# Patient Record
Sex: FEMALE | Race: WHITE | NOT HISPANIC OR LATINO | Employment: FULL TIME | URBAN - METROPOLITAN AREA
[De-identification: names, ages, dates, MRNs, and addresses within clinical notes are randomized per-mention and may not be internally consistent; named-entity substitution may affect disease eponyms.]

---

## 2023-03-20 PROBLEM — E66.813 CLASS 3 SEVERE OBESITY WITH SERIOUS COMORBIDITY AND BODY MASS INDEX (BMI) OF 40.0 TO 44.9 IN ADULT: Status: ACTIVE | Noted: 2023-03-20

## 2024-03-01 LAB
EXTERNAL HEPATITIS B SURFACE ANTIGEN: NEGATIVE
EXTERNAL HEPATITIS C AB: NON REACTIVE
EXTERNAL RUBELLA QUALITATIVE: NORMAL
HIV1 P24 AG SERPL QL IA: NORMAL

## 2024-04-05 ENCOUNTER — APPOINTMENT (OUTPATIENT)
Dept: ULTRASOUND IMAGING | Facility: HOSPITAL | Age: 29
End: 2024-04-05
Payer: COMMERCIAL

## 2024-04-05 ENCOUNTER — HOSPITAL ENCOUNTER (EMERGENCY)
Facility: HOSPITAL | Age: 29
Discharge: HOME | End: 2024-04-06
Attending: EMERGENCY MEDICINE
Payer: COMMERCIAL

## 2024-04-05 DIAGNOSIS — N89.8 VAGINAL DISCHARGE DURING PREGNANCY IN SECOND TRIMESTER: Primary | ICD-10-CM

## 2024-04-05 DIAGNOSIS — O26.892 VAGINAL DISCHARGE DURING PREGNANCY IN SECOND TRIMESTER: Primary | ICD-10-CM

## 2024-04-05 LAB
ALBUMIN SERPL-MCNC: 4.1 G/DL (ref 3.5–5.2)
ALBUMIN/GLOB SERPL: 1.4 G/DL
ALP SERPL-CCNC: 69 U/L (ref 39–117)
ALT SERPL W P-5'-P-CCNC: 13 U/L (ref 1–33)
ANION GAP SERPL CALCULATED.3IONS-SCNC: 12.9 MMOL/L (ref 5–15)
AST SERPL-CCNC: 16 U/L (ref 1–32)
BASOPHILS # BLD AUTO: 0.04 10*3/MM3 (ref 0–0.2)
BASOPHILS NFR BLD AUTO: 0.5 % (ref 0–1.5)
BILIRUB SERPL-MCNC: 0.3 MG/DL (ref 0–1.2)
BILIRUB UR QL STRIP: NEGATIVE
BUN SERPL-MCNC: 6 MG/DL (ref 6–20)
BUN/CREAT SERPL: 9.8 (ref 7–25)
CALCIUM SPEC-SCNC: 9.1 MG/DL (ref 8.6–10.5)
CHLORIDE SERPL-SCNC: 102 MMOL/L (ref 98–107)
CLARITY UR: ABNORMAL
CO2 SERPL-SCNC: 22.1 MMOL/L (ref 22–29)
COLOR UR: YELLOW
CREAT SERPL-MCNC: 0.61 MG/DL (ref 0.57–1)
DEPRECATED RDW RBC AUTO: 45.6 FL (ref 37–54)
EGFRCR SERPLBLD CKD-EPI 2021: 124.3 ML/MIN/1.73
EOSINOPHIL # BLD AUTO: 0.11 10*3/MM3 (ref 0–0.4)
EOSINOPHIL NFR BLD AUTO: 1.4 % (ref 0.3–6.2)
ERYTHROCYTE [DISTWIDTH] IN BLOOD BY AUTOMATED COUNT: 15.2 % (ref 12.3–15.4)
GLOBULIN UR ELPH-MCNC: 2.9 GM/DL
GLUCOSE SERPL-MCNC: 87 MG/DL (ref 65–99)
GLUCOSE UR STRIP-MCNC: NEGATIVE MG/DL
HCG INTACT+B SERPL-ACNC: NORMAL MIU/ML
HCT VFR BLD AUTO: 37.6 % (ref 34–46.6)
HGB BLD-MCNC: 11.8 G/DL (ref 12–15.9)
HGB UR QL STRIP.AUTO: NEGATIVE
IMM GRANULOCYTES # BLD AUTO: 0.03 10*3/MM3 (ref 0–0.05)
IMM GRANULOCYTES NFR BLD AUTO: 0.4 % (ref 0–0.5)
KETONES UR QL STRIP: ABNORMAL
LEUKOCYTE ESTERASE UR QL STRIP.AUTO: NEGATIVE
LYMPHOCYTES # BLD AUTO: 2.84 10*3/MM3 (ref 0.7–3.1)
LYMPHOCYTES NFR BLD AUTO: 36.3 % (ref 19.6–45.3)
MCH RBC QN AUTO: 25.8 PG (ref 26.6–33)
MCHC RBC AUTO-ENTMCNC: 31.4 G/DL (ref 31.5–35.7)
MCV RBC AUTO: 82.3 FL (ref 79–97)
MONOCYTES # BLD AUTO: 0.37 10*3/MM3 (ref 0.1–0.9)
MONOCYTES NFR BLD AUTO: 4.7 % (ref 5–12)
NEUTROPHILS NFR BLD AUTO: 4.44 10*3/MM3 (ref 1.7–7)
NEUTROPHILS NFR BLD AUTO: 56.7 % (ref 42.7–76)
NITRITE UR QL STRIP: NEGATIVE
NRBC BLD AUTO-RTO: 0 /100 WBC (ref 0–0.2)
PH UR STRIP.AUTO: 5.5 [PH] (ref 5–8)
PLATELET # BLD AUTO: 271 10*3/MM3 (ref 140–450)
PMV BLD AUTO: 9.7 FL (ref 6–12)
POTASSIUM SERPL-SCNC: 3.8 MMOL/L (ref 3.5–5.2)
PROT SERPL-MCNC: 7 G/DL (ref 6–8.5)
PROT UR QL STRIP: ABNORMAL
RBC # BLD AUTO: 4.57 10*6/MM3 (ref 3.77–5.28)
SODIUM SERPL-SCNC: 137 MMOL/L (ref 136–145)
SP GR UR STRIP: 1.02 (ref 1–1.03)
UROBILINOGEN UR QL STRIP: ABNORMAL
WBC NRBC COR # BLD AUTO: 7.83 10*3/MM3 (ref 3.4–10.8)

## 2024-04-05 PROCEDURE — 84702 CHORIONIC GONADOTROPIN TEST: CPT | Performed by: PHYSICIAN ASSISTANT

## 2024-04-05 PROCEDURE — 99284 EMERGENCY DEPT VISIT MOD MDM: CPT

## 2024-04-05 PROCEDURE — 80053 COMPREHEN METABOLIC PANEL: CPT | Performed by: EMERGENCY MEDICINE

## 2024-04-05 PROCEDURE — 85025 COMPLETE CBC W/AUTO DIFF WBC: CPT | Performed by: EMERGENCY MEDICINE

## 2024-04-05 PROCEDURE — 76815 OB US LIMITED FETUS(S): CPT

## 2024-04-05 PROCEDURE — 93976 VASCULAR STUDY: CPT

## 2024-04-05 PROCEDURE — 81003 URINALYSIS AUTO W/O SCOPE: CPT | Performed by: EMERGENCY MEDICINE

## 2024-04-05 PROCEDURE — 76817 TRANSVAGINAL US OBSTETRIC: CPT

## 2024-04-05 PROCEDURE — 36415 COLL VENOUS BLD VENIPUNCTURE: CPT

## 2024-04-06 VITALS
TEMPERATURE: 97.3 F | WEIGHT: 293 LBS | OXYGEN SATURATION: 100 % | BODY MASS INDEX: 45.99 KG/M2 | HEART RATE: 79 BPM | HEIGHT: 67 IN | RESPIRATION RATE: 18 BRPM | SYSTOLIC BLOOD PRESSURE: 134 MMHG | DIASTOLIC BLOOD PRESSURE: 72 MMHG

## 2024-04-06 NOTE — ED TRIAGE NOTES
Pt ambulatory to triage via pv  Pt is 13 weeks pregnant. Pt reports brown discharge that started 2 hours pta. Pt reports some abd cramping.

## 2024-04-06 NOTE — DISCHARGE INSTRUCTIONS
Please follow-up with your OB/GYN.    Although you are being discharged in the ED today, I encouraged her to return for worsening symptoms.  Things can, and do, change such a treatment at home with medication may not be adequate.  Specifically I recommend returning for chest pain or discomfort, difficulty breathing, persistent vomiting or difficulty holding down liquids or medications, fever > 102.0 F,  or any other worsening or alarming symptoms.       You have been evaluated in the emergency department for your presenting symptoms and deemed appropriate for discharge home.  Understand that your health care does not end here today.  It is important that your primary care physician be made aware of your visit.  I recommend calling your primary care provider in the next 48 hours to arrange follow-up care.  Your primary care provider needs to review your treatment and recovery to ensure that no further treatment is necessary.  Additional testing or procedures may be necessary as an outpatient at the discretion of your primary care provider.    I also recommend following up with your PCP for recheck of your blood pressure and treatment as needed.

## 2024-04-06 NOTE — ED PROVIDER NOTES
EMERGENCY DEPARTMENT ENCOUNTER  Room Number:    PCP: Cherelle Marcus APRN  Independent Historians: Patient      HPI:  Chief Complaint: had concerns including Vaginal Discharge.     A complete HPI/ROS/PMH/PSH/SH/FH are unobtainable due to: None    Chronic or social conditions impacting patient care (Social Determinants of Health): None      Context: Yusra Dumont is a 28 y.o.  female who is approximately 13 weeks pregnant with a medical history of depression who presents emergency department complaining of brown vaginal discharge that began earlier today.  Patient reports that she has had some lower abdominal cramping throughout her entire pregnancy but today started having some brown discharge.  She denies any red bleeding or bleeding large clots or tissue.  She reports nausea but denies any vomiting or diarrhea.  She denies fever or chills.  She denies urinary symptoms.      Review of prior external notes (non-ED) -and- Review of prior external test results outside of this encounter:   Blood type, O+    Hemoglobin A1c from 5/3/2023 was 5.2    I reviewed labs from 5/3/2023, hemoglobin 12.7      PAST MEDICAL HISTORY  Active Ambulatory Problems     Diagnosis Date Noted    Lumbar radiculopathy 2018    Cervical radiculitis 2019    Intractable migraine without aura and without status migrainosus 2020    Bilateral occipital neuralgia 2020    Ankle swelling 2023    Chronic fatigue 2023    Foot pain, bilateral 2023    Back pain 2023    Depression 2023    Class 3 severe obesity with serious comorbidity and body mass index (BMI) of 40.0 to 44.9 in adult 2023    Sacroiliac joint dysfunction of right side 2023     Resolved Ambulatory Problems     Diagnosis Date Noted    Right upper quadrant pain 2019    Folliculitis 2019    Weight gain 2020    Heartburn 2023    Nausea & vomiting 2023     Past Medical History:    Diagnosis Date    Anxiety attack     Dizziness     GERD (gastroesophageal reflux disease)     Headaches, cluster     History of medical problems BIlateral club foot surgery, Knee surgery    Irritable bowel syndrome     Low back pain     Migraine     Numbness and tingling     Peripheral neuropathy     PONV (postoperative nausea and vomiting)     Tension headache          PAST SURGICAL HISTORY  Past Surgical History:   Procedure Laterality Date    CLUB FOOT RELEASE       sugeries all under the age of 5    ENDOSCOPY N/A 2019    Procedure: ESOPHAGOGASTRODUODENOSCOPY with BX;  Surgeon: Fabio Peres MD;  Location: [facility] ENDOSCOPY;  Service: Gastroenterology    EPIDURAL BLOCK      KNEE SURGERY Left     scope, ligament repair    LAMINECTOMY  2019    LUMBAR DISCECTOMY Right 2019    Procedure: Right L3-4 laminectomy and discectomy with metrx;  Surgeon: Dean Putnam MD;  Location: [facility] MAIN OR;  Service: Neurosurgery    SACROILIAC JOINT INJECTION Right 10/19/2023    Procedure: SACROILIAC INJECTION;  Surgeon: Dmitry Coronado MD;  [place] [facility] [place]  Service: Pain Management;  Laterality: Right;    TONSILLECTOMY      UPPER GASTROINTESTINAL ENDOSCOPY  approx     normal per pt          FAMILY HISTORY  Family History   Problem Relation Age of Onset    Lung cancer Maternal Grandmother     Mental illness Maternal Grandmother     Hyperlipidemia Maternal Grandmother     Heart disease Maternal Grandfather          SOCIAL HISTORY  Social History     Socioeconomic History    Marital status:     Number of children: 0    Years of education: College   Tobacco Use    Smoking status: Former     Current packs/day: 0.00     Average packs/day: 0.3 packs/day for 5.0 years (1.3 ttl pk-yrs)     Types: Cigarettes     Start date: 2016     Quit date: 2021     Years since quittin.9     Passive exposure: Past    Smokeless tobacco: Never   Vaping Use    Vaping status: Never Used     Passive vaping exposure: Yes   Substance and Sexual Activity    Alcohol use: Yes     Comment: Socially    Drug use: No    Sexual activity: Yes     Partners: Male     Birth control/protection: None         ALLERGIES  Tylenol [acetaminophen]      REVIEW OF SYSTEMS  Included in HPI  All systems reviewed and negative except for those discussed in HPI.      PHYSICAL EXAM    I have reviewed the triage vital signs and nursing notes.    ED Triage Vitals   Temp Heart Rate Resp BP SpO2   04/05/24 2110 04/05/24 2110 04/05/24 2110 04/05/24 2113 04/05/24 2110   97.3 °F (36.3 °C) 111 18 150/89 100 %      Temp src Heart Rate Source Patient Position BP Location FiO2 (%)   04/05/24 2110 04/05/24 2110 -- -- --   Tympanic Monitor          Physical Exam  Constitutional:       General: She is not in acute distress.     Appearance: Normal appearance. She is obese.   HENT:      Head: Normocephalic and atraumatic.      Nose: Nose normal.      Mouth/Throat:      Mouth: Mucous membranes are moist.   Eyes:      Extraocular Movements: Extraocular movements intact.      Pupils: Pupils are equal, round, and reactive to light.   Cardiovascular:      Rate and Rhythm: Normal rate and regular rhythm.      Pulses: Normal pulses.      Heart sounds: Normal heart sounds.   Pulmonary:      Effort: Pulmonary effort is normal. No respiratory distress.      Breath sounds: Normal breath sounds.   Abdominal:      General: Abdomen is flat. There is no distension.      Palpations: Abdomen is soft.      Tenderness: There is no abdominal tenderness. There is no guarding or rebound.   Musculoskeletal:         General: Normal range of motion.      Cervical back: Normal range of motion and neck supple.   Skin:     General: Skin is warm and dry.      Capillary Refill: Capillary refill takes less than 2 seconds.   Neurological:      General: No focal deficit present.      Mental Status: She is alert and oriented to person, place, and time.   Psychiatric:         Mood  and Affect: Mood normal.         Behavior: Behavior normal.         LAB RESULTS  Recent Results (from the past 24 hour(s))   Comprehensive Metabolic Panel    Collection Time: 04/05/24  9:45 PM    Specimen: Blood   Result Value Ref Range    Glucose 87 65 - 99 mg/dL    BUN 6 6 - 20 mg/dL    Creatinine 0.61 0.57 - 1.00 mg/dL    Sodium 137 136 - 145 mmol/L    Potassium 3.8 3.5 - 5.2 mmol/L    Chloride 102 98 - 107 mmol/L    CO2 22.1 22.0 - 29.0 mmol/L    Calcium 9.1 8.6 - 10.5 mg/dL    Total Protein 7.0 6.0 - 8.5 g/dL    Albumin 4.1 3.5 - 5.2 g/dL    ALT (SGPT) 13 1 - 33 U/L    AST (SGOT) 16 1 - 32 U/L    Alkaline Phosphatase 69 39 - 117 U/L    Total Bilirubin 0.3 0.0 - 1.2 mg/dL    Globulin 2.9 gm/dL    A/G Ratio 1.4 g/dL    BUN/Creatinine Ratio 9.8 7.0 - 25.0    Anion Gap 12.9 5.0 - 15.0 mmol/L    eGFR 124.3 >60.0 mL/min/1.73   CBC Auto Differential    Collection Time: 04/05/24  9:45 PM    Specimen: Blood   Result Value Ref Range    WBC 7.83 3.40 - 10.80 10*3/mm3    RBC 4.57 3.77 - 5.28 10*6/mm3    Hemoglobin 11.8 (L) 12.0 - 15.9 g/dL    Hematocrit 37.6 34.0 - 46.6 %    MCV 82.3 79.0 - 97.0 fL    MCH 25.8 (L) 26.6 - 33.0 pg    MCHC 31.4 (L) 31.5 - 35.7 g/dL    RDW 15.2 12.3 - 15.4 %    RDW-SD 45.6 37.0 - 54.0 fl    MPV 9.7 6.0 - 12.0 fL    Platelets 271 140 - 450 10*3/mm3    Neutrophil % 56.7 42.7 - 76.0 %    Lymphocyte % 36.3 19.6 - 45.3 %    Monocyte % 4.7 (L) 5.0 - 12.0 %    Eosinophil % 1.4 0.3 - 6.2 %    Basophil % 0.5 0.0 - 1.5 %    Immature Grans % 0.4 0.0 - 0.5 %    Neutrophils, Absolute 4.44 1.70 - 7.00 10*3/mm3    Lymphocytes, Absolute 2.84 0.70 - 3.10 10*3/mm3    Monocytes, Absolute 0.37 0.10 - 0.90 10*3/mm3    Eosinophils, Absolute 0.11 0.00 - 0.40 10*3/mm3    Basophils, Absolute 0.04 0.00 - 0.20 10*3/mm3    Immature Grans, Absolute 0.03 0.00 - 0.05 10*3/mm3    nRBC 0.0 0.0 - 0.2 /100 WBC   Urinalysis With Microscopic If Indicated (No Culture) - Urine, Clean Catch    Collection Time: 04/05/24  9:50 PM     Specimen: Urine, Clean Catch   Result Value Ref Range    Color, UA Yellow Yellow, Straw    Appearance, UA Cloudy (A) Clear    pH, UA 5.5 5.0 - 8.0    Specific Gravity, UA 1.025 1.005 - 1.030    Glucose, UA Negative Negative    Ketones, UA 15 mg/dL (1+) (A) Negative    Bilirubin, UA Negative Negative    Blood, UA Negative Negative    Protein, UA Trace (A) Negative    Leuk Esterase, UA Negative Negative    Nitrite, UA Negative Negative    Urobilinogen, UA 0.2 E.U./dL 0.2 - 1.0 E.U./dL   hCG, Quantitative, Pregnancy    Collection Time: 04/05/24 10:11 PM    Specimen: Blood   Result Value Ref Range    HCG Quantitative 28,120.00 mIU/mL         RADIOLOGY  US Testicular or Ovarian Vascular Limited    Result Date: 4/6/2024  Patient: MARGIE ROGEL  Time Out: 01:35 Exam(s): US PELVIS EXAM:   US Pregnancy, transabdominal, Transvaginal with Doppler. CLINICAL HISTORY:    Reason for exam: Spotting, cramping, vaginal discharge.. TECHNIQUE:   Real-time transvaginal and transabdominal obstetrical ultrasound of the maternal pelvis and a first trimester pregnancy with image documentation.  Transvaginal imaging was used for better evaluation of the fetus and adnexa.  Color Doppler and Doppler spectral waveform analysis of the ovarian tissue was performed. COMPARISON:   No relevant prior studies available. FINDINGS:   Gestation:  Fetal heart rate 157 bpm.  There is a single intrauterine fetus.  Fetal pole crown-rump length measures 8 cm consistent with 14 weeks 0 days gestation.  Mean sac diameter 7.9 cm.  Fetal motion is present.  The fetal survey is limited by early gestational age but grossly unremarkable.   Placenta amniotic fluid:  The placenta is posterior grade 1.  The placenta is low-lying.  Consider follow-up ultrasound closer to term to rule out previa.   Uterus cervix:  The uterus measures 12.2 x 10 x 9.7 cm.  No myometrial mass.   Ovaries:  The right ovary measures 3.8 x 3.2 x 3.8 cm with a 2.1 cm simple cyst or follicle  within it.  Doppler blood flow is normal.  The left ovary is obscured.   Free fluid:  No free fluid. IMPRESSION:     1.  The placenta is posterior grade 1.  The placenta is low-lying.  Consider follow-up ultrasound closer to term to rule out previa. 2.  Fetal pole crown-rump length measures 8 cm consistent with 14 weeks 0 days gestation.  The estimated date of delivery is October 4, 2024.    Electronically signed by Fernandez Natarajan MD on 04-06-24 at 0135    US Ob Limited 1 + Fetuses    Result Date: 4/6/2024  Patient: MARGIE ROGEL  Time Out: 01:35 Exam(s): US OB LIMITED EXAM:   US Pregnancy, transabdominal, Transvaginal with Doppler. CLINICAL HISTORY:    Reason for exam: Spotting, cramping, vaginal discharge.. TECHNIQUE:   Real-time transvaginal and transabdominal obstetrical ultrasound of the maternal pelvis and a first trimester pregnancy with image documentation.  Transvaginal imaging was used for better evaluation of the fetus and adnexa.  Color Doppler and Doppler spectral waveform analysis of the ovarian tissue was performed. COMPARISON:   No relevant prior studies available. FINDINGS:   Gestation:  Fetal heart rate 157 bpm.  There is a single intrauterine fetus.  Fetal pole crown-rump length measures 8 cm consistent with 14 weeks 0 days gestation.  Mean sac diameter 7.9 cm.  Fetal motion is present.  The fetal survey is limited by early gestational age but grossly unremarkable.   Placenta amniotic fluid:  The placenta is posterior grade 1.  The placenta is low-lying.  Consider follow-up ultrasound closer to term to rule out previa.   Uterus cervix:  The uterus measures 12.2 x 10 x 9.7 cm.  No myometrial mass.   Ovaries:  The right ovary measures 3.8 x 3.2 x 3.8 cm with a 2.1 cm simple cyst or follicle within it.  Doppler blood flow is normal.  The left ovary is obscured.   Free fluid:  No free fluid. IMPRESSION:     1.  The placenta is posterior grade 1.  The placenta is low-lying.  Consider follow-up  ultrasound closer to term to rule out previa. 2.  Fetal pole crown-rump length measures 8 cm consistent with 14 weeks 0 days gestation.  The estimated date of delivery is October 4, 2024.    Electronically signed by Fernandez Natarajan MD on 04-06-24 at 0135    US Ob Transvaginal    Result Date: 4/6/2024  Patient: MARGIE ROGEL  Time Out: 01:34 Exam(s): US OB ENDOVAG EXAM:   US Pregnancy, transabdominal, Transvaginal with Doppler. CLINICAL HISTORY:    Reason for exam: Spotting, cramping, vaginal discharge.. TECHNIQUE:   Real-time transvaginal and transabdominal obstetrical ultrasound of the maternal pelvis and a first trimester pregnancy with image documentation.  Transvaginal imaging was used for better evaluation of the fetus and adnexa.  Color Doppler and Doppler spectral waveform analysis of the ovarian tissue was performed. COMPARISON:   No relevant prior studies available. FINDINGS:   Gestation:  Fetal heart rate 157 bpm.  There is a single intrauterine fetus.  Fetal pole crown-rump length measures 8 cm consistent with 14 weeks 0 days gestation.  Mean sac diameter 7.9 cm.  Fetal motion is present.  The fetal survey is limited by early gestational age but grossly unremarkable.   Placenta amniotic fluid:  The placenta is posterior grade 1.  The placenta is low-lying.  Consider follow-up ultrasound closer to term to rule out previa.   Uterus cervix:  The uterus measures 12.2 x 10 x 9.7 cm.  No myometrial mass.   Ovaries:  The right ovary measures 3.8 x 3.2 x 3.8 cm with a 2.1 cm simple cyst or follicle within it.  Doppler blood flow is normal.  The left ovary is obscured.   Free fluid:  No free fluid. IMPRESSION:     1.  The placenta is posterior grade 1.  The placenta is low-lying.  Consider follow-up ultrasound closer to term to rule out previa. 2.  Fetal pole crown-rump length measures 8 cm consistent with 14 weeks 0 days gestation.  The estimated date of delivery is October 4, 2024.     Electronically signed by  Fernandez Natarajan MD on 04-06-24 at 0134           OUTPATIENT MEDICATION MANAGEMENT:  No current Epic-ordered facility-administered medications on file.     Current Outpatient Medications Ordered in Epic   Medication Sig Dispense Refill    Prenatal FE-FA-DHA & Choline (ONE A DAY PRENATAL ADV BRAIN PO)              PROGRESS, DATA ANALYSIS, CONSULTS, AND MEDICAL DECISION MAKING  ORDERS PLACED DURING THIS VISIT:  Orders Placed This Encounter   Procedures    US Ob Limited 1 + Fetuses    US Ob Transvaginal    US Testicular or Ovarian Vascular Limited    Comprehensive Metabolic Panel    Urinalysis With Microscopic If Indicated (No Culture) - Urine, Clean Catch    CBC Auto Differential    hCG, Quantitative, Pregnancy    CBC & Differential       All labs have been independently interpreted by me.  All radiology studies have been reviewed by me. All EKG's have been independently viewed and interpreted by me.  Discussion below represents my analysis of pertinent findings related to patient's condition, differential diagnosis, treatment plan and final disposition.    Differential diagnosis includes but is not limited to:   My differential diagnosis for vaginal bleeding in a pregnant patient includes but is not limited to:  Bleeding in the first trimester   miscarriage   threatened miscarriage: with or without identifiable subchorionic hemorrhage   missed miscarriage   incomplete miscarriage   subchorionic hemorrhage   retained products of conception   ectopic pregnancy   gestational trophoblastic disease   demise of a twin   implantation bleeding  Bleeding in second and third trimesters   placental abruption   placenta previa   gestational trophoblastic disease   vasa previa   uterine rupture      ED Course:  ED Course as of 04/06/24 0150   Fri Apr 05, 2024 2159 I discussed the case with Dr. Braxton and they agree to evaluate the patient at the bedside.  [CC]   2208 Hemoglobin(!): 11.8 [CC]   Sat Apr 06, 2024   0034 Rechecked  on patient: She is resting comfortably in bed.  Discussed that we are still awaiting results of ultrasound.  Discussed with her that it appeared she had good fetal heart tones on ultrasound.  Will await final result prior to discharge. [CC]   0106 BP: 134/72 [CC]   0129 I rechecked the patient.  Ultrasound is still pending at the time of discharge but I did discuss that patient has fetal heart tones based on the ultrasound technicians worksheet.  I will call her with any abnormal results seen on ultrasound.  Return precautions were discussed.  I discussed the patient's labs, radiology findings (including all incidental findings), diagnosis, and plan for discharge.  A repeat exam reveals no new worrisome changes from my initial exam findings.  The patient understands that the fact that they are being discharged does not denote that nothing is abnormal, it indicates that no clinical emergency is present and that they must follow-up as directed in order to properly maintain their health.  Follow-up instructions (specifically listed below) and return to ER precautions were given at this time.  I specifically instructed the patient to follow-up with their PCP.  The patient understands and agrees with the plan, and is ready for discharge.  All questions answered.   [CC]      ED Course User Index  [CC] Odette Dong PA-C           AS OF 01:50 EDT VITALS:    BP - 134/72  HR - 79  TEMP - 97.3 °F (36.3 °C) (Tympanic)  O2 SATS - 100%      MDM:  Patient is a well-appearing 28-year-old female who is approximately 13 weeks pregnant who presents emergency department today complaining of brown discharge.  Patient denies any heavy vaginal bleeding or cramping.  On arrival here in the emergency department vitals are reassuring, she is afebrile.  On my exam the patient's abdomen is soft and nontender.  She was evaluated with labs which were overall reassuring.  Patient's blood type is O+.  She was evaluated with transvaginal  ultrasound which did reveal intrauterine pregnancy with fetal heart tones.  She had no recurrence of symptoms here and had no heavy vaginal bleeding.  She does have a low-lying placenta which I discussed with her and she will need to follow-up with OB/GYN for reevaluation as routinely scheduled.  There is no indication for RhoGAM given that she is Rh+.  Overall patient is appropriate for discharge with close outpatient follow-up.          DIAGNOSIS  Final diagnoses:   Vaginal discharge during pregnancy in second trimester         DISPOSITION  ED Disposition       ED Disposition   Discharge    Condition   Stable    Comment   --                      Please note that portions of this document were completed with a voice recognition program.    Note Disclaimer: At [facility], we believe that sharing information builds trust and better relationships. You are receiving this note because you recently visited [facility]. It is possible you will see health information before a provider has talked with you about it. This kind of information can be easy to misunderstand. To help you fully understand what it means for your health, we urge you to discuss this note with your provider.     Odette Dong PA-C  04/06/24 021

## 2024-04-06 NOTE — ED PROVIDER NOTES
MD ATTESTATION NOTE    The DANA and I have discussed this patient's history, physical exam, and treatment plan.  I have reviewed the documentation and personally had a face to face interaction with the patient. I affirm the documentation and agree with the treatment and plan.  The attached note describes my personal findings.      I provided a substantive portion of the care of the patient.  I personally performed the physical exam in its entirety, and below are my findings.     Brief HPI: Patient presents for evaluation of brown discharge.  Patient is 13 weeks pregnant.  This is her first pregnancy.  Patient states has had no vomiting.  Had mild cramping.  Has had prior ultrasound that showed intrauterine pregnancy.    PHYSICAL EXAM  ED Triage Vitals   Temp Heart Rate Resp BP SpO2   04/05/24 2110 04/05/24 2110 04/05/24 2110 04/05/24 2113 04/05/24 2110   97.3 °F (36.3 °C) 111 18 150/89 100 %      Temp src Heart Rate Source Patient Position BP Location FiO2 (%)   04/05/24 2110 04/05/24 2110 -- -- --   Tympanic Monitor            GENERAL: no acute distress  HENT: nares patent  EYES: no scleral icterus  CV: regular rhythm, normal rate  RESPIRATORY: normal effort  ABDOMEN: soft. Minimal tenderness  MUSCULOSKELETAL: no deformity  NEURO: alert, moves all extremities, follows commands  PSYCH:  calm, cooperative  SKIN: warm, dry    Vital signs and nursing notes reviewed.    Ultrasound independently interpreted by me and shows [facility]    ED Course as of 04/06/24 0851   Fri Apr 05, 2024 2159 I discussed the case with Dr. Braxton and they agree to evaluate the patient at the bedside.  [CC]   2208 Hemoglobin(!): 11.8 [CC]   Sat Apr 06, 2024   0034 Rechecked on patient: She is resting comfortably in bed.  Discussed that we are still awaiting results of ultrasound.  Discussed with her that it appeared she had good fetal heart tones on ultrasound.  Will await final result prior to discharge. [CC]   0106 BP: 134/72 [CC]   0129 I  rechecked the patient.  Ultrasound is still pending at the time of discharge but I did discuss that patient has fetal heart tones based on the ultrasound technicians worksheet.  I will call her with any abnormal results seen on ultrasound.  Return precautions were discussed.  I discussed the patient's labs, radiology findings (including all incidental findings), diagnosis, and plan for discharge.  A repeat exam reveals no new worrisome changes from my initial exam findings.  The patient understands that the fact that they are being discharged does not denote that nothing is abnormal, it indicates that no clinical emergency is present and that they must follow-up as directed in order to properly maintain their health.  Follow-up instructions (specifically listed below) and return to ER precautions were given at this time.  I specifically instructed the patient to follow-up with their PCP.  The patient understands and agrees with the plan, and is ready for discharge.  All questions answered.   [CC]      ED Course User Index  [CC] Odette Dong, LEON         Plan: Discharge    SHARED VISIT: This visit was performed by BOTH a physician and an APC. The substantive portion of the medical decision making was performed by this attesting physician who made or approved the management plan and takes responsibility for patient management. All studies in the APC note (if performed) were independently interpreted by me.         Dean Braxton MD  04/06/24 7044

## 2024-04-29 ENCOUNTER — TRANSCRIBE ORDERS (OUTPATIENT)
Dept: ULTRASOUND IMAGING | Facility: HOSPITAL | Age: 29
End: 2024-04-29
Payer: COMMERCIAL

## 2024-04-29 DIAGNOSIS — R89.9 ABNORMAL LABORATORY TEST RESULT: Primary | ICD-10-CM

## 2024-05-10 ENCOUNTER — HOSPITAL ENCOUNTER (OUTPATIENT)
Dept: ULTRASOUND IMAGING | Facility: HOSPITAL | Age: 29
Discharge: HOME | End: 2024-05-10
Payer: COMMERCIAL

## 2024-05-10 ENCOUNTER — OFFICE VISIT (OUTPATIENT)
Dept: OBSTETRICS AND GYNECOLOGY | Facility: CLINIC | Age: 29
End: 2024-05-10
Payer: COMMERCIAL

## 2024-05-10 ENCOUNTER — LAB (OUTPATIENT)
Dept: LAB | Facility: HOSPITAL | Age: 29
End: 2024-05-10
Payer: COMMERCIAL

## 2024-05-10 VITALS
SYSTOLIC BLOOD PRESSURE: 139 MMHG | TEMPERATURE: 98 F | DIASTOLIC BLOOD PRESSURE: 68 MMHG | BODY MASS INDEX: 45.83 KG/M2 | HEART RATE: 77 BPM | HEIGHT: 67 IN | WEIGHT: 292 LBS

## 2024-05-10 DIAGNOSIS — Z13.79 GENETIC TESTING: Primary | ICD-10-CM

## 2024-05-10 DIAGNOSIS — Z13.79 GENETIC TESTING: ICD-10-CM

## 2024-05-10 DIAGNOSIS — R89.9 ABNORMAL LABORATORY TEST RESULT: ICD-10-CM

## 2024-05-10 DIAGNOSIS — E66.01 CLASS 3 SEVERE OBESITY WITHOUT SERIOUS COMORBIDITY IN ADULT, UNSPECIFIED BMI, UNSPECIFIED OBESITY TYPE: ICD-10-CM

## 2024-05-10 PROCEDURE — 76811 OB US DETAILED SNGL FETUS: CPT

## 2024-05-20 ENCOUNTER — TELEPHONE (OUTPATIENT)
Dept: OBSTETRICS AND GYNECOLOGY | Facility: CLINIC | Age: 29
End: 2024-05-20
Payer: COMMERCIAL

## 2024-05-20 NOTE — TELEPHONE ENCOUNTER
Call placed to Yusra to review redraw Panorama results. Pt notified the results again showed low fetal fraction on cell free DNA. Pt notified this does not mean there is a chromosome issue, but Dr. Dale is concerned about placental insufficieny and would like to watch more closely to make sure baby is growing appropriately. Dr. Dale does not recommend redrawing at this time.     Pt offered amniocentesis as an option if she would like further information testing during the pregnancy. Informed pt she could do  genetic testing as well. Pt notified amniocentesis is an ultrasound guided procedure in which a needle is inserted into the abdomen in order to take amniotic fluid from around the baby to send off for additional testing. Pt would like to speak with her  before deciding. Yusra encouraged to call [facility] once she has decided. Pt aware she can also speak with Dr. Dale in greater detail prior to deciding on amniocentesis as well. No additional questions at this time.

## 2024-05-24 ENCOUNTER — TRANSCRIBE ORDERS (OUTPATIENT)
Dept: ULTRASOUND IMAGING | Facility: HOSPITAL | Age: 29
End: 2024-05-24
Payer: COMMERCIAL

## 2024-05-24 DIAGNOSIS — R89.9 ABNORMAL LABORATORY TEST RESULT: Primary | ICD-10-CM

## 2024-05-28 ENCOUNTER — TELEPHONE (OUTPATIENT)
Dept: OBSTETRICS AND GYNECOLOGY | Facility: CLINIC | Age: 29
End: 2024-05-28
Payer: COMMERCIAL

## 2024-05-28 NOTE — TELEPHONE ENCOUNTER
Call placed to Yusra to see if she had any further questions regarding amniocentesis. Pt and her  have decided they do not want amniocentesis at this time and would rather have  testing if needed.

## 2024-06-14 ENCOUNTER — HOSPITAL ENCOUNTER (OUTPATIENT)
Dept: ULTRASOUND IMAGING | Facility: HOSPITAL | Age: 29
Discharge: HOME | End: 2024-06-14
Admitting: OBSTETRICS & GYNECOLOGY
Payer: COMMERCIAL

## 2024-06-14 ENCOUNTER — OFFICE VISIT (OUTPATIENT)
Dept: OBSTETRICS AND GYNECOLOGY | Facility: CLINIC | Age: 29
End: 2024-06-14
Payer: COMMERCIAL

## 2024-06-14 VITALS
TEMPERATURE: 98.2 F | DIASTOLIC BLOOD PRESSURE: 73 MMHG | WEIGHT: 293 LBS | HEART RATE: 98 BPM | BODY MASS INDEX: 45.99 KG/M2 | HEIGHT: 67 IN | SYSTOLIC BLOOD PRESSURE: 135 MMHG

## 2024-06-14 DIAGNOSIS — R89.9 ABNORMAL LABORATORY TEST RESULT: ICD-10-CM

## 2024-06-14 DIAGNOSIS — E66.01 CLASS 3 SEVERE OBESITY WITHOUT SERIOUS COMORBIDITY IN ADULT, UNSPECIFIED BMI, UNSPECIFIED OBESITY TYPE: ICD-10-CM

## 2024-06-14 DIAGNOSIS — Z13.79 GENETIC TESTING: Primary | ICD-10-CM

## 2024-06-14 PROCEDURE — 99214 OFFICE O/P EST MOD 30 MIN: CPT | Performed by: OBSTETRICS & GYNECOLOGY

## 2024-06-14 PROCEDURE — 76816 OB US FOLLOW-UP PER FETUS: CPT

## 2024-06-14 NOTE — LETTER
2024       No Recipients    Patient: Yusra Dumont   YOB: 1995   Date of Visit: 2024       Dear Sil Rucker MD    Yusra Dumont was in my office today. Below is a copy of my note.    If you have questions, please do not hesitate to call me. I look forward to following Yusra along with you.         Sincerely,        Meagan Dale MD      MATERNAL FETAL MEDICINE Consult Note    Dear Dr Sil Rucker MD:    Thank you for your kind referral of Yusra Dumont.  As you know, she is a 28 y.o.   at  23 4/7 weeks gestation (Estimated Date of Delivery: 10/7/24).  This is a consult.     Her antepartum course is complicated by:  Insufficient fetal fraction x 2 at 10 and 11 weeks  BMI 45  Bilateral club feet (maternal), not seen on fetus    HPI: Today, she denies headache, blurry vision, RUQ pain. No vaginal bleeding, no contractions.     Review of History:  Past Medical History:   Diagnosis Date   • Anxiety attack    • Dizziness    • GERD (gastroesophageal reflux disease)    • Headaches, cluster    • History of medical problems BIlateral club foot surgery, Knee surgery   • Irritable bowel syndrome    • Low back pain    • Migraine    • Numbness and tingling    • Peripheral neuropathy    • PONV (postoperative nausea and vomiting)    • Tension headache      Past Surgical History:   Procedure Laterality Date   • CLUB FOOT RELEASE       sugeries all under the age of 5   • ENDOSCOPY N/A 2019    Procedure: ESOPHAGOGASTRODUODENOSCOPY with BX;  Surgeon: Fabio Peres MD;  Location: [facility] ENDOSCOPY;  Service: Gastroenterology   • EPIDURAL BLOCK     • KNEE SURGERY Left     scope, ligament repair   • LAMINECTOMY     • LUMBAR DISCECTOMY Right 2019    Procedure: Right L3-4 laminectomy and discectomy with metrx;  Surgeon: Dean Putnam MD;  Location: [facility] MAIN OR;  Service: Neurosurgery   • SACROILIAC JOINT INJECTION Right 10/19/2023     "Procedure: SACROILIAC INJECTION;  Surgeon: Dmitry Coronado MD;  Location: [facility] OR;  Service: Pain Management;  Laterality: Right;   • TONSILLECTOMY     • UPPER GASTROINTESTINAL ENDOSCOPY  approx 2014    normal per pt        Social History     Socioeconomic History   • Marital status:    • Number of children: 0   • Years of education: College   Tobacco Use   • Smoking status: Former     Current packs/day: 0.00     Average packs/day: 0.3 packs/day for 5.0 years (1.3 ttl pk-yrs)     Types: Cigarettes     Start date: 5/1/2016     Quit date: 5/1/2021     Years since quitting: 3.1     Passive exposure: Past   • Smokeless tobacco: Never   Vaping Use   • Vaping status: Never Used   • Passive vaping exposure: Yes   Substance and Sexual Activity   • Alcohol use: Yes     Comment: Socially   • Drug use: Not Currently   • Sexual activity: Yes     Partners: Male     Birth control/protection: None     Family History   Problem Relation Age of Onset   • Lung cancer Maternal Grandmother    • Mental illness Maternal Grandmother    • Hyperlipidemia Maternal Grandmother    • Heart disease Maternal Grandfather       Allergies   Allergen Reactions   • Tylenol [Acetaminophen] Other (See Comments)     \" Makes my allergies go crazy and my throat swells up      Current Outpatient Medications on File Prior to Visit   Medication Sig Dispense Refill   • Prenatal FE-FA-DHA & Choline (ONE A DAY PRENATAL ADV BRAIN PO)        No current facility-administered medications on file prior to visit.        Past obstetric, gynecological, medical, surgical, family and social history reviewed.  Relevant lab work and imaging reviewed.    Review of systems  Constitutional:  denies fever, chills, malaise.   ENT/Mouth:  denies sore throat, tinnitus  Eyes: denies vision changes/pain  CV:  denies chest pain  Respiratory:  denies cough/SOB  GI:  denies N/V, diarrhea, abdominal pain.    :   denies dysuria  Skin:  denies lesions or pruritus   Neuro: " " denies weakness, focal neurologic symptoms    Vitals:    24 1504   BP: 135/73   BP Location: Right arm   Patient Position: Sitting   Pulse: 98   Temp: 98.2 °F (36.8 °C)   TempSrc: Temporal   Weight: 134 kg (295 lb)   Height: 170.2 cm (67\")       PHYSICAL EXAM   GENERAL: Not in acute distress, AAOx3, pleasant  CARDIO: regular rate and rhythm  PULM: symmetric chest rise, speaking in complete sentences without difficulty  NEURO: awake, alert and oriented to person, place, and time  ABDOMINAL: No fundal tenderness, no rebound or guarding, gravid  EXTREMITIES: no bilateral lower extremity edema/tenderness  SKIN: Warm, well-perfused      ULTRASOUND   Please view full ultrasound note on Imaging tab in ViewPoint.  Breech presentation.  Posterior placenta.  BLANCA 16 cm, which is normal.    g (51%, AC 44%)  Anatomy appears normal with a few suboptimal heart views.    Cervical length >3.5 cm, which is normal.      ASSESSMENT/COUNSELIN y.o.   at  23 4/7 weeks gestation (Estimated Date of Delivery: 10/7/24)    -Pregnancy  [ X ] stable  [   ] improving [  ] worsening    Diagnoses and all orders for this visit:    1. Genetic testing (Primary)    2. Class 3 severe obesity without serious comorbidity in adult, unspecified BMI, unspecified obesity type         Insufficient fetal fraction x3--Panorama:  She had another redraw and was still insufficient fetal fraction. Discussed options of amniocentesis, final redraw (possibly with another platform but would not address triploidy), and expectant management with increased fetal testing.  I discussed option of diagnostic testing via amniocentesis and discussed the / risk of fetal loss, PPROM, PTD related to this procedure.  I emphasized that this is much less than 1% and that the procedure is safe and a very reasonable option, but I would not necessarily recommend unless she feels strongly about it.  She declines and would like to expectantly manage with serial " growths and weekly ANFS at 32 weeks.     I did discuss with her that inconsistently the literature suggests that this result, especially when it happens repeatedly may be a heralding sign of placental insufficiency (pre-eclampsia, FGR) later in pregnancy.  I discussed the signs and symptoms of pre-eclampsia and recommend serial growth exams to assess for FGR during the pregnancy, as well as ANFS starting at 32-34 weeks to continue to assess for this.        Summary of Plan  -Serial growth ultrasounds every 4 weeks (primary OB, we will do next to complete anatomy)  -Starting at 32 weeks: Weekly fetal  surveillance until delivery at primary OB  -Delivery 39 weeks unless indicated sooner  -Baby ASA if not already taking.      Follow-up: 1 month repeat anatomy    Thank you for the consult and opportunity to care for this patient.  Please feel free to reach out with any questions or concerns.      I spent 30 minutes caring for this patient on this date of service. This time includes time spent by me in the following activities: preparing for the visit, reviewing tests, obtaining and/or reviewing a separately obtained history, performing a medically appropriate examination and/or evaluation, counseling and educating the patient/family/caregiver and independently interpreting results and communicating that information with the patient/family/caregiver with greater than 50% spent in counseling and coordination of care.       I spent 4 minutes on the separately reported service of US imaging not included in the time used to support the E/M service also reported today.      Meagan Dale MD FACOG  Maternal Fetal Medicine-[facility]  Office: [phone]  [email or web address]

## 2024-06-14 NOTE — PROGRESS NOTES
Pt reports that she is doing well and denies vaginal bleeding, cramping, contractions or LOF at this time. Patient reports that she has occasional back pain. Reports active fetal movement. Reviewed when to call OB office or present to L&D for evaluation with symptoms such as decreased fetal movement, vaginal bleeding, LOF or ctxs. Pt verbalized understanding. Denies HA, visual changes or epigastric pain. Denies any additional complaints at time of appointment.     Vitals:    06/14/24 1504   BP: 135/73   Pulse: 98   Temp: 98.2 °F (36.8 °C)

## 2024-06-14 NOTE — PROGRESS NOTES
MATERNAL FETAL MEDICINE Consult Note    Dear Dr Sil Rucker MD:    Thank you for your kind referral of Yusra Dumont.  As you know, she is a 28 y.o.   at  23 4/7 weeks gestation (Estimated Date of Delivery: 10/7/24).  This is a consult.     Her antepartum course is complicated by:  Insufficient fetal fraction x 2 at 10 and 11 weeks  BMI 45  Bilateral club feet (maternal), not seen on fetus    HPI: Today, she denies headache, blurry vision, RUQ pain. No vaginal bleeding, no contractions.     Review of History:  Past Medical History:   Diagnosis Date    Anxiety attack     Dizziness     GERD (gastroesophageal reflux disease)     Headaches, cluster     History of medical problems BIlateral club foot surgery, Knee surgery    Irritable bowel syndrome     Low back pain     Migraine     Numbness and tingling     Peripheral neuropathy     PONV (postoperative nausea and vomiting)     Tension headache      Past Surgical History:   Procedure Laterality Date    CLUB FOOT RELEASE       sugeries all under the age of 4    ENDOSCOPY N/A 2019    Procedure: ESOPHAGOGASTRODUODENOSCOPY with BX;  Surgeon: Fabio Peres MD;  Location: [facility] ENDOSCOPY;  Service: Gastroenterology    EPIDURAL BLOCK      KNEE SURGERY Left     scope, ligament repair    LAMINECTOMY  2019    LUMBAR DISCECTOMY Right 2019    Procedure: Right L3-4 laminectomy and discectomy with metrx;  Surgeon: Dean Putnam MD;  Location: [facility] MAIN OR;  Service: Neurosurgery    SACROILIAC JOINT INJECTION Right 10/19/2023    Procedure: SACROILIAC INJECTION;  Surgeon: Dmitry Coronado MD;  [place] [facility] [place];  Service: Pain Management;  Laterality: Right;    TONSILLECTOMY      UPPER GASTROINTESTINAL ENDOSCOPY  approx 2014    normal per pt        Social History     Socioeconomic History    Marital status:     Number of children: 0    Years of education: College   Tobacco Use    Smoking status: Former     Current  "packs/day: 0.00     Average packs/day: 0.3 packs/day for 5.0 years (1.3 ttl pk-yrs)     Types: Cigarettes     Start date: 5/1/2016     Quit date: 5/1/2021     Years since quitting: 3.1     Passive exposure: Past    Smokeless tobacco: Never   Vaping Use    Vaping status: Never Used    Passive vaping exposure: Yes   Substance and Sexual Activity    Alcohol use: Yes     Comment: Socially    Drug use: Not Currently    Sexual activity: Yes     Partners: Male     Birth control/protection: None     Family History   Problem Relation Age of Onset    Lung cancer Maternal Grandmother     Mental illness Maternal Grandmother     Hyperlipidemia Maternal Grandmother     Heart disease Maternal Grandfather       Allergies   Allergen Reactions    Tylenol [Acetaminophen] Other (See Comments)     \" Makes my allergies go crazy and my throat swells up      Current Outpatient Medications on File Prior to Visit   Medication Sig Dispense Refill    Prenatal FE-FA-DHA & Choline (ONE A DAY PRENATAL ADV BRAIN PO)        No current facility-administered medications on file prior to visit.        Past obstetric, gynecological, medical, surgical, family and social history reviewed.  Relevant lab work and imaging reviewed.    Review of systems  Constitutional:  denies fever, chills, malaise.   ENT/Mouth:  denies sore throat, tinnitus  Eyes: denies vision changes/pain  CV:  denies chest pain  Respiratory:  denies cough/SOB  GI:  denies N/V, diarrhea, abdominal pain.    :   denies dysuria  Skin:  denies lesions or pruritus   Neuro:  denies weakness, focal neurologic symptoms    Vitals:    06/14/24 1504   BP: 135/73   BP Location: Right arm   Patient Position: Sitting   Pulse: 98   Temp: 98.2 °F (36.8 °C)   TempSrc: Temporal   Weight: 134 kg (295 lb)   Height: 170.2 cm (67\")       PHYSICAL EXAM   GENERAL: Not in acute distress, AAOx3, pleasant  CARDIO: regular rate and rhythm  PULM: symmetric chest rise, speaking in complete sentences without " difficulty  NEURO: awake, alert and oriented to person, place, and time  ABDOMINAL: No fundal tenderness, no rebound or guarding, gravid  EXTREMITIES: no bilateral lower extremity edema/tenderness  SKIN: Warm, well-perfused      ULTRASOUND   Please view full ultrasound note on Imaging tab in ViewPoint.  Breech presentation.  Posterior placenta.  BLANCA 16 cm, which is normal.    g (51%, AC 44%)  Anatomy appears normal with a few suboptimal heart views.    Cervical length >3.5 cm, which is normal.      ASSESSMENT/COUNSELIN y.o.   at  23 4/7 weeks gestation (Estimated Date of Delivery: 10/7/24)    -Pregnancy  [ X ] stable  [   ] improving [  ] worsening    Diagnoses and all orders for this visit:    1. Genetic testing (Primary)    2. Class 3 severe obesity without serious comorbidity in adult, unspecified BMI, unspecified obesity type         Insufficient fetal fraction x3--Panorama:  She had another redraw and was still insufficient fetal fraction. Discussed options of amniocentesis, final redraw (possibly with another platform but would not address triploidy), and expectant management with increased fetal testing.  I discussed option of diagnostic testing via amniocentesis and discussed the 1/900 risk of fetal loss, PPROM, PTD related to this procedure.  I emphasized that this is much less than 1% and that the procedure is safe and a very reasonable option, but I would not necessarily recommend unless she feels strongly about it.  She declines and would like to expectantly manage with serial growths and weekly ANFS at 32 weeks.     I did discuss with her that inconsistently the literature suggests that this result, especially when it happens repeatedly may be a heralding sign of placental insufficiency (pre-eclampsia, FGR) later in pregnancy.  I discussed the signs and symptoms of pre-eclampsia and recommend serial growth exams to assess for FGR during the pregnancy, as well as ANFS starting at 32-34  weeks to continue to assess for this.        Summary of Plan  -Serial growth ultrasounds every 4 weeks (primary OB, we will do next to complete anatomy)  -Starting at 32 weeks: Weekly fetal  surveillance until delivery at primary OB  -Delivery 39 weeks unless indicated sooner  -Baby ASA if not already taking.      Follow-up: 1 month repeat anatomy    Thank you for the consult and opportunity to care for this patient.  Please feel free to reach out with any questions or concerns.      I spent 30 minutes caring for this patient on this date of service. This time includes time spent by me in the following activities: preparing for the visit, reviewing tests, obtaining and/or reviewing a separately obtained history, performing a medically appropriate examination and/or evaluation, counseling and educating the patient/family/caregiver and independently interpreting results and communicating that information with the patient/family/caregiver with greater than 50% spent in counseling and coordination of care.       I spent 4 minutes on the separately reported service of US imaging not included in the time used to support the E/M service also reported today.      Meagan Dale MD FACOG  Maternal Fetal Medicine-[facility]  Office: [phone]  [email or web address]

## 2024-06-19 ENCOUNTER — TRANSCRIBE ORDERS (OUTPATIENT)
Dept: ULTRASOUND IMAGING | Facility: HOSPITAL | Age: 29
End: 2024-06-19
Payer: COMMERCIAL

## 2024-06-19 DIAGNOSIS — R89.9 ABNORMAL LABORATORY TEST RESULT: Primary | ICD-10-CM

## 2024-07-15 ENCOUNTER — OFFICE VISIT (OUTPATIENT)
Dept: OBSTETRICS AND GYNECOLOGY | Facility: CLINIC | Age: 29
End: 2024-07-15
Payer: COMMERCIAL

## 2024-07-15 ENCOUNTER — HOSPITAL ENCOUNTER (OUTPATIENT)
Dept: ULTRASOUND IMAGING | Facility: HOSPITAL | Age: 29
Discharge: HOME | End: 2024-07-15
Admitting: OBSTETRICS & GYNECOLOGY
Payer: COMMERCIAL

## 2024-07-15 VITALS
SYSTOLIC BLOOD PRESSURE: 136 MMHG | HEIGHT: 67 IN | DIASTOLIC BLOOD PRESSURE: 76 MMHG | BODY MASS INDEX: 45.99 KG/M2 | HEART RATE: 95 BPM | TEMPERATURE: 97.8 F | WEIGHT: 293 LBS

## 2024-07-15 DIAGNOSIS — Z36.89 ENCOUNTER FOR FETAL ANATOMIC SURVEY: Primary | ICD-10-CM

## 2024-07-15 DIAGNOSIS — Z13.79 GENETIC TESTING: ICD-10-CM

## 2024-07-15 DIAGNOSIS — R89.9 ABNORMAL LABORATORY TEST RESULT: ICD-10-CM

## 2024-07-15 DIAGNOSIS — E66.01 CLASS 3 SEVERE OBESITY WITHOUT SERIOUS COMORBIDITY IN ADULT, UNSPECIFIED BMI, UNSPECIFIED OBESITY TYPE: ICD-10-CM

## 2024-07-15 PROCEDURE — 76819 FETAL BIOPHYS PROFIL W/O NST: CPT

## 2024-07-15 PROCEDURE — 76819 FETAL BIOPHYS PROFIL W/O NST: CPT | Performed by: OBSTETRICS & GYNECOLOGY

## 2024-07-15 PROCEDURE — 76816 OB US FOLLOW-UP PER FETUS: CPT | Performed by: OBSTETRICS & GYNECOLOGY

## 2024-07-15 PROCEDURE — 99213 OFFICE O/P EST LOW 20 MIN: CPT | Performed by: OBSTETRICS & GYNECOLOGY

## 2024-07-15 PROCEDURE — 76816 OB US FOLLOW-UP PER FETUS: CPT

## 2024-07-15 NOTE — LETTER
July 15, 2024     Sil Rucker MD  [address]  [facility] [address]  [facility] [address]    Patient: Yusra Dumont   YOB: 1995   Date of Visit: 7/15/2024       Dear Sil Rucker MD    Yusra Dumont was in my office today. Below is a copy of my note.    If you have questions, please do not hesitate to call me. I look forward to following Yusra along with you.         Sincerely,        Meagan Dale MD      MATERNAL FETAL MEDICINE Consult Note    Dear Dr Sil Rucker MD:    Thank you for your kind referral of Yusra Dumont.  As you know, she is a 28 y.o.   at  28 0/7 weeks gestation (Estimated Date of Delivery: 10/7/24).  This is a consult.     Her antepartum course is complicated by:  Insufficient fetal fraction x 2 at 10 and 11 weeks  BMI 45  Bilateral club feet (maternal), not seen on fetus    HPI: Today, she denies headache, blurry vision, RUQ pain. No vaginal bleeding, no contractions.     Review of History:  Past Medical History:   Diagnosis Date   • Anxiety attack    • Dizziness    • GERD (gastroesophageal reflux disease)    • Headaches, cluster    • History of medical problems BIlateral club foot surgery, Knee surgery   • Irritable bowel syndrome    • Low back pain    • Migraine    • Numbness and tingling    • Peripheral neuropathy    • PONV (postoperative nausea and vomiting)    • Tension headache      Past Surgical History:   Procedure Laterality Date   • CLUB FOOT RELEASE       sugeries all under the age of 5   • ENDOSCOPY N/A 2019    Procedure: ESOPHAGOGASTRODUODENOSCOPY with BX;  Surgeon: Fabio Peres MD;  Location: [facility] ENDOSCOPY;  Service: Gastroenterology   • EPIDURAL BLOCK     • KNEE SURGERY Left     scope, ligament repair   • LAMINECTOMY     • LUMBAR DISCECTOMY Right 2019    Procedure: Right L3-4 laminectomy and discectomy with metrx;  Surgeon: Dean Putnam MD;  Location: [facility] MAIN OR;  Service: Neurosurgery   •  "SACROILIAC JOINT INJECTION Right 10/19/2023    Procedure: SACROILIAC INJECTION;  Surgeon: Dmitry Coronado MD;  [place] [facility] [place];  Service: Pain Management;  Laterality: Right;   • TONSILLECTOMY     • UPPER GASTROINTESTINAL ENDOSCOPY  approx 2014    normal per pt        Social History     Socioeconomic History   • Marital status:    • Number of children: 0   • Years of education: College   Tobacco Use   • Smoking status: Former     Current packs/day: 0.00     Average packs/day: 0.3 packs/day for 5.0 years (1.3 ttl pk-yrs)     Types: Cigarettes     Start date: 5/1/2016     Quit date: 5/1/2021     Years since quitting: 3.2     Passive exposure: Past   • Smokeless tobacco: Never   Vaping Use   • Vaping status: Never Used   • Passive vaping exposure: Yes   Substance and Sexual Activity   • Alcohol use: Yes     Comment: Socially   • Drug use: Not Currently   • Sexual activity: Yes     Partners: Male     Birth control/protection: None     Family History   Problem Relation Age of Onset   • Lung cancer Maternal Grandmother    • Mental illness Maternal Grandmother    • Hyperlipidemia Maternal Grandmother    • Heart disease Maternal Grandfather       Allergies   Allergen Reactions   • Tylenol [Acetaminophen] Other (See Comments)     \" Makes my allergies go crazy and my throat swells up      Current Outpatient Medications on File Prior to Visit   Medication Sig Dispense Refill   • Prenatal FE-FA-DHA & Choline (ONE A DAY PRENATAL ADV BRAIN PO)        No current facility-administered medications on file prior to visit.        Past obstetric, gynecological, medical, surgical, family and social history reviewed.  Relevant lab work and imaging reviewed.    Review of systems  Constitutional:  denies fever, chills, malaise.   ENT/Mouth:  denies sore throat, tinnitus  Eyes: denies vision changes/pain  CV:  denies chest pain  Respiratory:  denies cough/SOB  GI:  denies N/V, diarrhea, abdominal pain.    :   denies " "dysuria  Skin:  denies lesions or pruritus   Neuro:  denies weakness, focal neurologic symptoms    Vitals:    07/15/24 0748   BP: 136/76   BP Location: Right arm   Patient Position: Sitting   Pulse: 95   Temp: 97.8 °F (36.6 °C)   TempSrc: Temporal   Weight: 134 kg (296 lb 6.4 oz)   Height: 170.2 cm (67\")       PHYSICAL EXAM   GENERAL: Not in acute distress, AAOx3, pleasant  CARDIO: regular rate and rhythm  PULM: symmetric chest rise, speaking in complete sentences without difficulty  NEURO: awake, alert and oriented to person, place, and time  ABDOMINAL: No fundal tenderness, no rebound or guarding, gravid  EXTREMITIES: no bilateral lower extremity edema/tenderness  SKIN: Warm, well-perfused      ULTRASOUND   Please view full ultrasound note on Imaging tab in ViewPoint.  Cephalic presentation.  Posterior placenta.  BLANCA 21 cm, which is normal.   EFW 1237 g (59%, AC 58%)  BPP    Anatomy follow up appears normal except still suboptimally viewed outflow tracts.    ASSESSMENT/COUNSELIN y.o.   at  28 0/7 weeks gestation (Estimated Date of Delivery: 10/7/24).     -Pregnancy  [ X ] stable  [   ] improving [  ] worsening    Diagnoses and all orders for this visit:    1. Encounter for fetal anatomic survey (Primary)    2. Genetic testing    3. Class 3 severe obesity without serious comorbidity in adult, unspecified BMI, unspecified obesity type        Insufficient fetal fraction x3--Panorama:  She had another redraw and was still insufficient fetal fraction. Discussed options of amniocentesis, final redraw (possibly with another platform but would not address triploidy), and expectant management with increased fetal testing.  I discussed option of diagnostic testing via amniocentesis and discussed the  risk of fetal loss, PPROM, PTD related to this procedure.  I emphasized that this is much less than 1% and that the procedure is safe and a very reasonable option, but I would not necessarily recommend unless " she feels strongly about it.  She declines and would like to expectantly manage with serial growths and weekly ANFS at 32 weeks.     I did discuss with her that inconsistently the literature suggests that this result, especially when it happens repeatedly may be a heralding sign of placental insufficiency (pre-eclampsia, FGR) later in pregnancy.  I discussed the signs and symptoms of pre-eclampsia and recommend serial growth exams to assess for FGR during the pregnancy, as well as ANFS starting at 32-34 weeks to continue to assess for this.        Summary of Plan  -Serial growth ultrasounds every 4 weeks (primary OB, we will do next to complete anatomy)  -Starting at 32 weeks: Weekly fetal  surveillance until delivery at primary OB  -Delivery 39 weeks unless indicated sooner  -Baby ASA if not already taking.      Follow-up: 1 month repeat anatomy    Thank you for the consult and opportunity to care for this patient.  Please feel free to reach out with any questions or concerns.      I spent 15 minutes caring for this patient on this date of service. This time includes time spent by me in the following activities: preparing for the visit, reviewing tests, obtaining and/or reviewing a separately obtained history, performing a medically appropriate examination and/or evaluation, counseling and educating the patient/family/caregiver and independently interpreting results and communicating that information with the patient/family/caregiver with greater than 50% spent in counseling and coordination of care.       I spent 4 minutes on the separately reported service of US imaging not included in the time used to support the E/M service also reported today.      Meagan Dlae MD FACOG  Maternal Fetal Medicine-[facility]  Office: [phone]  [email or web address]

## 2024-07-15 NOTE — PROGRESS NOTES
Pt reports that she is doing well and denies vaginal bleeding, cramping, contractions or LOF at this time. Reports active fetal movement. Reviewed when to call OB office or present to L&D for evaluation with symptoms such as decreased fetal movement, vaginal bleeding, LOF or ctxs. Pt verbalized understanding. Denies HA or epigastric pain at this time. Notes occasional blurry vision and lower extremity swelling. Next OB appointment scheduled for 7/19.    Vitals:    07/15/24 0748   BP: 136/76   Pulse: 95   Temp: 97.8 °F (36.6 °C)

## 2024-07-15 NOTE — PROGRESS NOTES
MATERNAL FETAL MEDICINE Consult Note    Dear Dr Sil Rucker MD:    Thank you for your kind referral of Yusra Dumont.  As you know, she is a 28 y.o.   at  28 0/7 weeks gestation (Estimated Date of Delivery: 10/7/24).  This is a consult.     Her antepartum course is complicated by:  Insufficient fetal fraction x 2 at 10 and 11 weeks  BMI 45  Bilateral club feet (maternal), not seen on fetus    HPI: Today, she denies headache, blurry vision, RUQ pain. No vaginal bleeding, no contractions.     Review of History:  Past Medical History:   Diagnosis Date    Anxiety attack     Dizziness     GERD (gastroesophageal reflux disease)     Headaches, cluster     History of medical problems BIlateral club foot surgery, Knee surgery    Irritable bowel syndrome     Low back pain     Migraine     Numbness and tingling     Peripheral neuropathy     PONV (postoperative nausea and vomiting)     Tension headache      Past Surgical History:   Procedure Laterality Date    CLUB FOOT RELEASE       sugeries all under the age of 5    ENDOSCOPY N/A 2019    Procedure: ESOPHAGOGASTRODUODENOSCOPY with BX;  Surgeon: Fabio Peres MD;  Location: [facility] ENDOSCOPY;  Service: Gastroenterology    EPIDURAL BLOCK      KNEE SURGERY Left     scope, ligament repair    LAMINECTOMY  2019    LUMBAR DISCECTOMY Right 2019    Procedure: Right L3-4 laminectomy and discectomy with metrx;  Surgeon: Dean Putnam MD;  Location: [facility] MAIN OR;  Service: Neurosurgery    SACROILIAC JOINT INJECTION Right 10/19/2023    Procedure: SACROILIAC INJECTION;  Surgeon: Dmitry Coronado MD;  [place] [facility] [place];  Service: Pain Management;  Laterality: Right;    TONSILLECTOMY      UPPER GASTROINTESTINAL ENDOSCOPY  approx 2014    normal per pt        Social History     Socioeconomic History    Marital status:     Number of children: 0    Years of education: College   Tobacco Use    Smoking status: Former     Current  "packs/day: 0.00     Average packs/day: 0.3 packs/day for 5.0 years (1.3 ttl pk-yrs)     Types: Cigarettes     Start date: 5/1/2016     Quit date: 5/1/2021     Years since quitting: 3.2     Passive exposure: Past    Smokeless tobacco: Never   Vaping Use    Vaping status: Never Used    Passive vaping exposure: Yes   Substance and Sexual Activity    Alcohol use: Yes     Comment: Socially    Drug use: Not Currently    Sexual activity: Yes     Partners: Male     Birth control/protection: None     Family History   Problem Relation Age of Onset    Lung cancer Maternal Grandmother     Mental illness Maternal Grandmother     Hyperlipidemia Maternal Grandmother     Heart disease Maternal Grandfather       Allergies   Allergen Reactions    Tylenol [Acetaminophen] Other (See Comments)     \" Makes my allergies go crazy and my throat swells up      Current Outpatient Medications on File Prior to Visit   Medication Sig Dispense Refill    Prenatal FE-FA-DHA & Choline (ONE A DAY PRENATAL ADV BRAIN PO)        No current facility-administered medications on file prior to visit.        Past obstetric, gynecological, medical, surgical, family and social history reviewed.  Relevant lab work and imaging reviewed.    Review of systems  Constitutional:  denies fever, chills, malaise.   ENT/Mouth:  denies sore throat, tinnitus  Eyes: denies vision changes/pain  CV:  denies chest pain  Respiratory:  denies cough/SOB  GI:  denies N/V, diarrhea, abdominal pain.    :   denies dysuria  Skin:  denies lesions or pruritus   Neuro:  denies weakness, focal neurologic symptoms    Vitals:    07/15/24 0748   BP: 136/76   BP Location: Right arm   Patient Position: Sitting   Pulse: 95   Temp: 97.8 °F (36.6 °C)   TempSrc: Temporal   Weight: 134 kg (296 lb 6.4 oz)   Height: 170.2 cm (67\")       PHYSICAL EXAM   GENERAL: Not in acute distress, AAOx3, pleasant  CARDIO: regular rate and rhythm  PULM: symmetric chest rise, speaking in complete sentences without " difficulty  NEURO: awake, alert and oriented to person, place, and time  ABDOMINAL: No fundal tenderness, no rebound or guarding, gravid  EXTREMITIES: no bilateral lower extremity edema/tenderness  SKIN: Warm, well-perfused      ULTRASOUND   Please view full ultrasound note on Imaging tab in ViewPoint.  Cephalic presentation.  Posterior placenta.  BLANCA 21 cm, which is normal.   EFW 1237 g (59%, AC 58%)  BPP    Anatomy follow up appears normal except still suboptimally viewed outflow tracts.    ASSESSMENT/COUNSELIN y.o.   at  28 0/7 weeks gestation (Estimated Date of Delivery: 10/7/24).     -Pregnancy  [ X ] stable  [   ] improving [  ] worsening    Diagnoses and all orders for this visit:    1. Encounter for fetal anatomic survey (Primary)    2. Genetic testing    3. Class 3 severe obesity without serious comorbidity in adult, unspecified BMI, unspecified obesity type        Insufficient fetal fraction x3--Panorama:  She had another redraw and was still insufficient fetal fraction. Discussed options of amniocentesis, final redraw (possibly with another platform but would not address triploidy), and expectant management with increased fetal testing.  I discussed option of diagnostic testing via amniocentesis and discussed the 1/900 risk of fetal loss, PPROM, PTD related to this procedure.  I emphasized that this is much less than 1% and that the procedure is safe and a very reasonable option, but I would not necessarily recommend unless she feels strongly about it.  She declines and would like to expectantly manage with serial growths and weekly ANFS at 32 weeks.     I did discuss with her that inconsistently the literature suggests that this result, especially when it happens repeatedly may be a heralding sign of placental insufficiency (pre-eclampsia, FGR) later in pregnancy.  I discussed the signs and symptoms of pre-eclampsia and recommend serial growth exams to assess for FGR during the pregnancy,  as well as ANFS starting at 32-34 weeks to continue to assess for this.        Summary of Plan  -Serial growth ultrasounds every 4 weeks (primary OB, we will do next to complete anatomy)  -Starting at 32 weeks: Weekly fetal  surveillance until delivery at primary OB  -Delivery 39 weeks unless indicated sooner  -Baby ASA if not already taking.      Follow-up: 1 month repeat anatomy    Thank you for the consult and opportunity to care for this patient.  Please feel free to reach out with any questions or concerns.      I spent 15 minutes caring for this patient on this date of service. This time includes time spent by me in the following activities: preparing for the visit, reviewing tests, obtaining and/or reviewing a separately obtained history, performing a medically appropriate examination and/or evaluation, counseling and educating the patient/family/caregiver and independently interpreting results and communicating that information with the patient/family/caregiver with greater than 50% spent in counseling and coordination of care.       I spent 4 minutes on the separately reported service of US imaging not included in the time used to support the E/M service also reported today.      Meagan Dale MD FACOG  Maternal Fetal Medicine-[facility]  Office: [phone]  [email or web address]

## 2024-07-21 ENCOUNTER — HOSPITAL ENCOUNTER (EMERGENCY)
Facility: HOSPITAL | Age: 29
Discharge: HOME | End: 2024-07-21
Attending: OBSTETRICS & GYNECOLOGY | Admitting: OBSTETRICS & GYNECOLOGY
Payer: COMMERCIAL

## 2024-07-21 VITALS
RESPIRATION RATE: 18 BRPM | HEART RATE: 77 BPM | DIASTOLIC BLOOD PRESSURE: 65 MMHG | TEMPERATURE: 98.3 F | SYSTOLIC BLOOD PRESSURE: 133 MMHG | OXYGEN SATURATION: 98 %

## 2024-07-21 LAB
A1 MICROGLOB PLACENTAL VAG QL: NEGATIVE
BACTERIA SPEC AEROBE CULT: NO GROWTH
BACTERIA UR QL AUTO: ABNORMAL /HPF
BILIRUB UR QL STRIP: NEGATIVE
CLARITY UR: ABNORMAL
COLOR UR: ABNORMAL
GLUCOSE UR STRIP-MCNC: NEGATIVE MG/DL
HGB UR QL STRIP.AUTO: NEGATIVE
HYALINE CASTS UR QL AUTO: ABNORMAL /LPF
KETONES UR QL STRIP: ABNORMAL
LEUKOCYTE ESTERASE UR QL STRIP.AUTO: ABNORMAL
NITRITE UR QL STRIP: NEGATIVE
PH UR STRIP.AUTO: 6.5 [PH] (ref 5–8)
PROT UR QL STRIP: ABNORMAL
RBC # UR STRIP: ABNORMAL /HPF
REF LAB TEST METHOD: ABNORMAL
SP GR UR STRIP: 1.02 (ref 1–1.03)
SQUAMOUS #/AREA URNS HPF: ABNORMAL /HPF
UROBILINOGEN UR QL STRIP: ABNORMAL
WBC # UR STRIP: ABNORMAL /HPF

## 2024-07-21 PROCEDURE — 59025 FETAL NON-STRESS TEST: CPT

## 2024-07-21 PROCEDURE — 87086 URINE CULTURE/COLONY COUNT: CPT | Performed by: OBSTETRICS & GYNECOLOGY

## 2024-07-21 PROCEDURE — 84112 EVAL AMNIOTIC FLUID PROTEIN: CPT | Performed by: OBSTETRICS & GYNECOLOGY

## 2024-07-21 PROCEDURE — 81001 URINALYSIS AUTO W/SCOPE: CPT | Performed by: OBSTETRICS & GYNECOLOGY

## 2024-07-21 PROCEDURE — 99284 EMERGENCY DEPT VISIT MOD MDM: CPT | Performed by: OBSTETRICS & GYNECOLOGY

## 2024-07-21 NOTE — NURSING NOTE
Discharge materials and Urgent Maternal Warning Signs given. No questions or concerns at this time.

## 2024-07-21 NOTE — OBED NOTES
[facility]  Yusra Dumont  : 1995  MRN: [id number]  CSN: [id number]    OB ED Provider Note    Subjective   Chief Complaint   Patient presents with    Rupture of Membranes     From [facility]: reports a big gush of fluid around 0930; denies VB, Ctx     Yusra Dumont is a 28 y.o. year old  with an Estimated Date of Delivery: 10/7/24 currently at 28w6d presenting with possible ROM. She had a single large gush of fluid this morning after getting out of the shower.  She denies CTX or VB. FM is present. .    Prenatal care has been with Dr. Rucker.  It has been benign.    OB History    Para Term  AB Living   1 0 0 0 0 0   SAB IAB Ectopic Molar Multiple Live Births   0 0 0 0 0 0      # Outcome Date GA Lbr Rafa/2nd Weight Sex Type Anes PTL Lv   1 Current              Past Medical History:   Diagnosis Date    Anxiety attack     Dizziness     GERD (gastroesophageal reflux disease)     Headaches, cluster     History of medical problems BIlateral club foot surgery, Knee surgery    Irritable bowel syndrome     Low back pain     Migraine     Numbness and tingling     Peripheral neuropathy     PONV (postoperative nausea and vomiting)     Tension headache      Past Surgical History:   Procedure Laterality Date    KNEE SURGERY Left     scope, ligament repair    ENDOSCOPY N/A 2019    Procedure: ESOPHAGOGASTRODUODENOSCOPY with BX;  Surgeon: Fabio Peres MD;  Location: [facility] ENDOSCOPY;  Service: Gastroenterology    LUMBAR DISCECTOMY Right 2019    Procedure: Right L3-4 laminectomy and discectomy with metrx;  Surgeon: Dean Putnam MD;  Location: [facility] MAIN OR;  Service: Neurosurgery    SACROILIAC JOINT INJECTION Right 10/19/2023    Procedure: SACROILIAC INJECTION;  Surgeon: Dmitry Coronado MD;  Location: [facility] MAIN OR;  Service: Pain Management;  Laterality: Right;    CLUB FOOT RELEASE      4/5 sugeries all under the age of 4    EPIDURAL BLOCK      LAMINECTOMY       "TONSILLECTOMY      UPPER GASTROINTESTINAL ENDOSCOPY  approx 2014    normal per pt      No current facility-administered medications for this encounter.    Allergies   Allergen Reactions    Tylenol [Acetaminophen] Other (See Comments)     \" Makes my allergies go crazy and my throat swells up     Social History    Tobacco Use      Smoking status: Former        Packs/day: 0.00        Years: 0.3 packs/day for 5.0 years (1.3 ttl pk-yrs)        Types: Cigarettes        Start date: 5/1/2016        Quit date: 5/1/2021        Years since quitting: 3.2        Passive exposure: Past      Smokeless tobacco: Never    Review of Systems   Genitourinary:  Positive for vaginal discharge.   All other systems reviewed and are negative.        Objective   /63   Pulse 77   Temp 98.3 °F (36.8 °C) (Oral)   Resp 18   LMP 01/01/2024   General: well developed; well nourished  no acute distress   Abdomen: soft, non-tender; no masses  gravid    FHT's: non-reassuring and and gestational age appropriate      Cervix: was checked (by RN): 0.5 cm / 30 % / -2   Presentation: Unable to appreciate   Contractions: regular   Chest: Unlabored respirations    CV:  RRR   Ext:   No C/C/E   Back: CVA tenderness is deferred bilateral        Prenatal Labs  Lab Results   Component Value Date    HGB 11.8 (L) 04/05/2024    URINECX Final report 11/13/2019       Current Labs Reviewed   UA:    Lab Results   Component Value Date    SQUAMEPIUA 7-12 (A) 07/21/2024    SPECGRAVUR 1.025 07/21/2024    KETONESU 40 mg/dL (2+) (A) 07/21/2024    BLOODU Negative 07/21/2024    LEUKOCYTESUR Trace (A) 07/21/2024    NITRITEU Negative 07/21/2024    RBCUA 0-2 07/21/2024    WBCUA 3-5 (A) 07/21/2024    BACTERIA 1+ (A) 07/21/2024     ROM+ negative     Assessment   IUP at 28w6d  Disorder fetal membranes not found     Plan   D/C home with instructions to return for worsening symptoms, acute changes. PTL precautions given. Keep scheduled appointments.    Benedict Diaz, " MD  7/21/2024  11:23 EDT

## 2024-08-01 ENCOUNTER — TRANSCRIBE ORDERS (OUTPATIENT)
Dept: ULTRASOUND IMAGING | Facility: HOSPITAL | Age: 29
End: 2024-08-01
Payer: COMMERCIAL

## 2024-08-01 DIAGNOSIS — R89.9 ABNORMAL LABORATORY TEST RESULT: Primary | ICD-10-CM

## 2024-08-01 DIAGNOSIS — Z41.9 SURGERY, ELECTIVE: ICD-10-CM

## 2024-08-08 LAB — EXTERNAL GROUP B STREP ANTIGEN: NEGATIVE

## 2024-08-14 ENCOUNTER — OFFICE VISIT (OUTPATIENT)
Dept: OBSTETRICS AND GYNECOLOGY | Facility: CLINIC | Age: 29
End: 2024-08-14
Payer: COMMERCIAL

## 2024-08-14 ENCOUNTER — HOSPITAL ENCOUNTER (OUTPATIENT)
Dept: ULTRASOUND IMAGING | Facility: HOSPITAL | Age: 29
Discharge: HOME | End: 2024-08-14
Admitting: OBSTETRICS & GYNECOLOGY
Payer: COMMERCIAL

## 2024-08-14 VITALS
WEIGHT: 293 LBS | SYSTOLIC BLOOD PRESSURE: 128 MMHG | TEMPERATURE: 97.9 F | DIASTOLIC BLOOD PRESSURE: 80 MMHG | BODY MASS INDEX: 47.3 KG/M2 | HEART RATE: 94 BPM

## 2024-08-14 DIAGNOSIS — R89.9 ABNORMAL LABORATORY TEST RESULT: ICD-10-CM

## 2024-08-14 DIAGNOSIS — Z13.79 GENETIC TESTING: Primary | ICD-10-CM

## 2024-08-14 DIAGNOSIS — Z36.89 ENCOUNTER FOR FETAL ANATOMIC SURVEY: ICD-10-CM

## 2024-08-14 DIAGNOSIS — E66.01 CLASS 3 SEVERE OBESITY WITHOUT SERIOUS COMORBIDITY IN ADULT, UNSPECIFIED BMI, UNSPECIFIED OBESITY TYPE: ICD-10-CM

## 2024-08-14 PROCEDURE — 76816 OB US FOLLOW-UP PER FETUS: CPT

## 2024-08-14 PROCEDURE — 76819 FETAL BIOPHYS PROFIL W/O NST: CPT

## 2024-08-14 RX ORDER — FERROUS SULFATE 325(65) MG
325 TABLET ORAL
COMMUNITY

## 2024-08-14 NOTE — PROGRESS NOTES
Reason for test:  BPP 6/8 (-movements)  Date of Test: 8/14/2024  Time frame of test: 7470-9562  RN NST Interpretation:  Reactive. +Accelerations, no decelerations, no contractions. Very active and audible fetal movement throughout the NST.   Right tilt: 0857, left tilt: 0908, orange juice: 0915, left turn: 0936.

## 2024-08-14 NOTE — PROGRESS NOTES
MATERNAL FETAL MEDICINE Consult Note    Dear Dr Sil Rucker MD:    Thank you for your kind referral of Yusra Dumont.  As you know, she is a 28 y.o.   at  32 2/7 weeks gestation (Estimated Date of Delivery: 10/7/24).  This is a consult.     Her antepartum course is complicated by:  Insufficient fetal fraction x 2 at 10 and 11 weeks  BMI 45  Bilateral club feet (maternal), not seen on fetus    HPI: Today, she denies headache, blurry vision, RUQ pain. No vaginal bleeding, no contractions.     Review of History:  Past Medical History:   Diagnosis Date    Anxiety attack     Dizziness     GERD (gastroesophageal reflux disease)     Headaches, cluster     History of medical problems BIlateral club foot surgery, Knee surgery    Irritable bowel syndrome     Low back pain     Migraine     Numbness and tingling     Peripheral neuropathy     PONV (postoperative nausea and vomiting)     Tension headache      Past Surgical History:   Procedure Laterality Date    CLUB FOOT RELEASE       sugeries all under the age of 5    ENDOSCOPY N/A 2019    Procedure: ESOPHAGOGASTRODUODENOSCOPY with BX;  Surgeon: Fabio Peres MD;  Location: [facility] ENDOSCOPY;  Service: Gastroenterology    EPIDURAL BLOCK      KNEE SURGERY Left     scope, ligament repair    LAMINECTOMY  2019    LUMBAR DISCECTOMY Right 2019    Procedure: Right L3-4 laminectomy and discectomy with metrx;  Surgeon: Dean Putnam MD;  Location: [facility] MAIN OR;  Service: Neurosurgery    SACROILIAC JOINT INJECTION Right 10/19/2023    Procedure: SACROILIAC INJECTION;  Surgeon: Dmitry Coronado MD;  Location: [facility] MAIN OR;  Service: Pain Management;  Laterality: Right;    TONSILLECTOMY      UPPER GASTROINTESTINAL ENDOSCOPY  approx 2014    normal per pt        Social History     Socioeconomic History    Marital status:     Number of children: 0    Years of education: College   Tobacco Use    Smoking status: Former     Current  "packs/day: 0.00     Average packs/day: 0.3 packs/day for 5.0 years (1.3 ttl pk-yrs)     Types: Cigarettes     Start date: 5/1/2016     Quit date: 5/1/2021     Years since quitting: 3.2     Passive exposure: Past    Smokeless tobacco: Never   Vaping Use    Vaping status: Never Used    Passive vaping exposure: Yes   Substance and Sexual Activity    Alcohol use: Yes     Comment: Socially    Drug use: Not Currently    Sexual activity: Yes     Partners: Male     Birth control/protection: None     Family History   Problem Relation Age of Onset    Lung cancer Maternal Grandmother     Mental illness Maternal Grandmother     Hyperlipidemia Maternal Grandmother     Heart disease Maternal Grandfather       Allergies   Allergen Reactions    Tylenol [Acetaminophen] Other (See Comments)     \" Makes my allergies go crazy and my throat swells up      Current Outpatient Medications on File Prior to Visit   Medication Sig Dispense Refill    ferrous sulfate 325 (65 FE) MG tablet Take 1 tablet by mouth Daily With Breakfast.      Prenatal FE-FA-DHA & Choline (ONE A DAY PRENATAL ADV BRAIN PO)        No current facility-administered medications on file prior to visit.        Past obstetric, gynecological, medical, surgical, family and social history reviewed.  Relevant lab work and imaging reviewed.    Review of systems  Constitutional:  denies fever, chills, malaise.   ENT/Mouth:  denies sore throat, tinnitus  Eyes: denies vision changes/pain  CV:  denies chest pain  Respiratory:  denies cough/SOB  GI:  denies N/V, diarrhea, abdominal pain.    :   denies dysuria  Skin:  denies lesions or pruritus   Neuro:  denies weakness, focal neurologic symptoms    Vitals:    08/14/24 0733 08/14/24 0824 08/14/24 0825 08/14/24 0827   BP: 139/66 138/76 139/80 128/80   BP Location: Left arm Right arm Left arm Right arm   Patient Position: Sitting Sitting Sitting Sitting   Pulse: 101 93 94    Temp:       TempSrc:       Weight:           PHYSICAL EXAM "   GENERAL: Not in acute distress, AAOx3, pleasant  CARDIO: regular rate and rhythm  PULM: symmetric chest rise, speaking in complete sentences without difficulty  NEURO: awake, alert and oriented to person, place, and time  ABDOMINAL: No fundal tenderness, no rebound or guarding, gravid  EXTREMITIES: no bilateral lower extremity edema/tenderness  SKIN: Warm, well-perfused      ULTRASOUND   Please view full ultrasound note on Imaging tab in ViewPoint.  Breech presentation.  Posterior placenta.  BLANCA 17 cm, which is normal.   EFW 2160 g (62%, AC 88%)  BPP 6/8 (-2 movement) 8/10 with NST, which is reassuring.    Follow up heart views appear normal.      NST:  Indication: decreased movement on NST  Duration: 30 min  Findings: 140/mod/+acc/no dec  No ctx.    Reactive/reassuring.      ASSESSMENT/COUNSELIN y.o.   at  32 2/7 weeks gestation (Estimated Date of Delivery: 10/7/24).     -Pregnancy  [ X ] stable  [   ] improving [  ] worsening    Diagnoses and all orders for this visit:    1. Genetic testing (Primary)    2. Class 3 severe obesity without serious comorbidity in adult, unspecified BMI, unspecified obesity type    3. Encounter for fetal anatomic survey      Insufficient fetal fraction x3--Panorama:  Previously counseled:  She had another redraw and was still insufficient fetal fraction. Discussed options of amniocentesis, final redraw (possibly with another platform but would not address triploidy), and expectant management with increased fetal testing.  She declines amnio and would like to expectantly manage with serial growths and weekly ANFS at 32 weeks.     I did discuss with her that inconsistently the literature suggests that this result, especially when it happens repeatedly may be a heralding sign of placental insufficiency (pre-eclampsia, FGR) later in pregnancy.  I discussed the signs and symptoms of pre-eclampsia and recommend serial growth exams to assess for FGR during the pregnancy, as well  as ANFS starting at 32-34 weeks to continue to assess for this.      BPP  but 8/10 on NST.  Baby very active after US.  Pt starting ANFS with Dr. Rucker's office Friday.  Anatomy completed.      Summary of Plan  -Serial growth ultrasounds every 4 weeks (primary OB)  -Starting at 32 weeks: Weekly fetal  surveillance until delivery at primary OB  -Delivery 39 weeks unless indicated sooner  -Baby ASA if not already taking.      Follow-up: No follow up with MFM--pt doing growths and ANFS at     Thank you for the consult and opportunity to care for this patient.  Please feel free to reach out with any questions or concerns.      I spent 20 minutes caring for this patient on this date of service. This time includes time spent by me in the following activities: preparing for the visit, reviewing tests, obtaining and/or reviewing a separately obtained history, performing a medically appropriate examination and/or evaluation, counseling and educating the patient/family/caregiver and independently interpreting results and communicating that information with the patient/family/caregiver with greater than 50% spent in counseling and coordination of care.       I spent 4 minutes on the separately reported service of US imaging not included in the time used to support the E/M service also reported today.      Meagan Dale MD [facility]  Maternal Fetal Medicine-[facility]  Office: [phone]  [email or web address]

## 2024-08-14 NOTE — PROGRESS NOTES
Pt reports that she is doing well and denies vaginal bleeding, cramping, contractions or LOF at this time. Reports active fetal movement. Reviewed when to call OB office or present to L&D for evaluation with symptoms such as decreased fetal movement, vaginal bleeding, LOF or ctxs. Pt verbalized understanding. Denies HA, visual changes or epigastric pain. Denies any additional complaints at time of appointment. Next OB appointment scheduled for 8/16.    Vitals:    08/14/24 0733   BP: 139/66   Pulse: 101   Temp:

## 2024-08-14 NOTE — LETTER
2024     Sil Rucker MD  [address]  [facility] [address]  [facility] [address]    Patient: Yusra Dumont   YOB: 1995   Date of Visit: 2024       Dear Sil Rucker MD    Yusra Dumont was in my office today. Below is a copy of my note.    If you have questions, please do not hesitate to call me. I look forward to following Yusra along with you.         Sincerely,        Meagan Dale MD  MATERNAL FETAL MEDICINE Consult Note    Dear Dr Sil Rucker MD:    Thank you for your kind referral of Yusra Dumont.  As you know, she is a 28 y.o.   at  32 2/7 weeks gestation (Estimated Date of Delivery: 10/7/24).  This is a consult.     Her antepartum course is complicated by:  Insufficient fetal fraction x 2 at 10 and 11 weeks  BMI 45  Bilateral club feet (maternal), not seen on fetus    HPI: Today, she denies headache, blurry vision, RUQ pain. No vaginal bleeding, no contractions.     Review of History:  Past Medical History:   Diagnosis Date   • Anxiety attack    • Dizziness    • GERD (gastroesophageal reflux disease)    • Headaches, cluster    • History of medical problems BIlateral club foot surgery, Knee surgery   • Irritable bowel syndrome    • Low back pain    • Migraine    • Numbness and tingling    • Peripheral neuropathy    • PONV (postoperative nausea and vomiting)    • Tension headache      Past Surgical History:   Procedure Laterality Date   • CLUB FOOT RELEASE       sugeries all under the age of 5   • ENDOSCOPY N/A 2019    Procedure: ESOPHAGOGASTRODUODENOSCOPY with BX;  Surgeon: Fabio Peres MD;  Location: [facility] ENDOSCOPY;  Service: Gastroenterology   • EPIDURAL BLOCK     • KNEE SURGERY Left     scope, ligament repair   • LAMINECTOMY     • LUMBAR DISCECTOMY Right 2019    Procedure: Right L3-4 laminectomy and discectomy with metrx;  Surgeon: Dean Putnam MD;  Location: [facility] MAIN OR;  Service: Neurosurgery   •  "SACROILIAC JOINT INJECTION Right 10/19/2023    Procedure: SACROILIAC INJECTION;  Surgeon: Dmitry Coronado MD;  Location: [facility] MAIN OR;  Service: Pain Management;  Laterality: Right;   • TONSILLECTOMY     • UPPER GASTROINTESTINAL ENDOSCOPY  approx 2014    normal per pt        Social History     Socioeconomic History   • Marital status:    • Number of children: 0   • Years of education: College   Tobacco Use   • Smoking status: Former     Current packs/day: 0.00     Average packs/day: 0.3 packs/day for 5.0 years (1.3 ttl pk-yrs)     Types: Cigarettes     Start date: 5/1/2016     Quit date: 5/1/2021     Years since quitting: 3.2     Passive exposure: Past   • Smokeless tobacco: Never   Vaping Use   • Vaping status: Never Used   • Passive vaping exposure: Yes   Substance and Sexual Activity   • Alcohol use: Yes     Comment: Socially   • Drug use: Not Currently   • Sexual activity: Yes     Partners: Male     Birth control/protection: None     Family History   Problem Relation Age of Onset   • Lung cancer Maternal Grandmother    • Mental illness Maternal Grandmother    • Hyperlipidemia Maternal Grandmother    • Heart disease Maternal Grandfather       Allergies   Allergen Reactions   • Tylenol [Acetaminophen] Other (See Comments)     \" Makes my allergies go crazy and my throat swells up      Current Outpatient Medications on File Prior to Visit   Medication Sig Dispense Refill   • ferrous sulfate 325 (65 FE) MG tablet Take 1 tablet by mouth Daily With Breakfast.     • Prenatal FE-FA-DHA & Choline (ONE A DAY PRENATAL ADV BRAIN PO)        No current facility-administered medications on file prior to visit.        Past obstetric, gynecological, medical, surgical, family and social history reviewed.  Relevant lab work and imaging reviewed.    Review of systems  Constitutional:  denies fever, chills, malaise.   ENT/Mouth:  denies sore throat, tinnitus  Eyes: denies vision changes/pain  CV:  denies chest " pain  Respiratory:  denies cough/SOB  GI:  denies N/V, diarrhea, abdominal pain.    :   denies dysuria  Skin:  denies lesions or pruritus   Neuro:  denies weakness, focal neurologic symptoms    Vitals:    24 0733 24 0824 24 0825 24 0827   BP: 139/66 138/76 139/80 128/80   BP Location: Left arm Right arm Left arm Right arm   Patient Position: Sitting Sitting Sitting Sitting   Pulse: 101 93 94    Temp:       TempSrc:       Weight:           PHYSICAL EXAM   GENERAL: Not in acute distress, AAOx3, pleasant  CARDIO: regular rate and rhythm  PULM: symmetric chest rise, speaking in complete sentences without difficulty  NEURO: awake, alert and oriented to person, place, and time  ABDOMINAL: No fundal tenderness, no rebound or guarding, gravid  EXTREMITIES: no bilateral lower extremity edema/tenderness  SKIN: Warm, well-perfused      ULTRASOUND   Please view full ultrasound note on Imaging tab in ViewPoint.  Breech presentation.  Posterior placenta.  BLANCA 17 cm, which is normal.   EFW 2160 g (62%, AC 88%)  BPP 6/8 (-2 movement) 8/10 with NST, which is reassuring.    Follow up heart views appear normal.      NST:  Indication: decreased movement on NST  Duration: 30 min  Findings: 140/mod/+acc/no dec  No ctx.    Reactive/reassuring.      ASSESSMENT/COUNSELIN y.o.   at  32 2/7 weeks gestation (Estimated Date of Delivery: 10/7/24).     -Pregnancy  [ X ] stable  [   ] improving [  ] worsening    Diagnoses and all orders for this visit:    1. Genetic testing (Primary)    2. Class 3 severe obesity without serious comorbidity in adult, unspecified BMI, unspecified obesity type    3. Encounter for fetal anatomic survey      Insufficient fetal fraction x3--Panorama:  Previously counseled:  She had another redraw and was still insufficient fetal fraction. Discussed options of amniocentesis, final redraw (possibly with another platform but would not address triploidy), and expectant management with  increased fetal testing.  She declines amnio and would like to expectantly manage with serial growths and weekly ANFS at 32 weeks.     I did discuss with her that inconsistently the literature suggests that this result, especially when it happens repeatedly may be a heralding sign of placental insufficiency (pre-eclampsia, FGR) later in pregnancy.  I discussed the signs and symptoms of pre-eclampsia and recommend serial growth exams to assess for FGR during the pregnancy, as well as ANFS starting at 32-34 weeks to continue to assess for this.      BPP  but 8/10 on NST.  Baby very active after US.  Pt starting ANFS with Dr. Rucker's office Friday.  Anatomy completed.      Summary of Plan  -Serial growth ultrasounds every 4 weeks (primary OB)  -Starting at 32 weeks: Weekly fetal  surveillance until delivery at primary OB  -Delivery 39 weeks unless indicated sooner  -Baby ASA if not already taking.      Follow-up: No follow up with MFM--pt doing growths and ANFS at     Thank you for the consult and opportunity to care for this patient.  Please feel free to reach out with any questions or concerns.      I spent 20 minutes caring for this patient on this date of service. This time includes time spent by me in the following activities: preparing for the visit, reviewing tests, obtaining and/or reviewing a separately obtained history, performing a medically appropriate examination and/or evaluation, counseling and educating the patient/family/caregiver and independently interpreting results and communicating that information with the patient/family/caregiver with greater than 50% spent in counseling and coordination of care.       I spent 4 minutes on the separately reported service of US imaging not included in the time used to support the E/M service also reported today.      Meagan Dale MD FACOG  Maternal Fetal Medicine-[facility]  Office: [phone]  [email or web address]

## 2024-09-27 ENCOUNTER — ANESTHESIA (OUTPATIENT)
Dept: LABOR AND DELIVERY | Facility: HOSPITAL | Age: 29
End: 2024-09-27
Payer: COMMERCIAL

## 2024-09-27 ENCOUNTER — HOSPITAL ENCOUNTER (INPATIENT)
Facility: HOSPITAL | Age: 29
LOS: 3 days | Discharge: HOME | End: 2024-09-30
Attending: OBSTETRICS & GYNECOLOGY | Admitting: OBSTETRICS & GYNECOLOGY
Payer: COMMERCIAL

## 2024-09-27 ENCOUNTER — ANESTHESIA EVENT (OUTPATIENT)
Dept: LABOR AND DELIVERY | Facility: HOSPITAL | Age: 29
End: 2024-09-27
Payer: COMMERCIAL

## 2024-09-27 DIAGNOSIS — Z98.891 STATUS POST PRIMARY LOW TRANSVERSE CESAREAN SECTION: Primary | ICD-10-CM

## 2024-09-27 PROBLEM — Z34.90 PREGNANCY: Status: ACTIVE | Noted: 2024-09-27

## 2024-09-27 LAB
ABO GROUP BLD: NORMAL
ALBUMIN SERPL-MCNC: 3.4 G/DL (ref 3.5–5.2)
ALBUMIN/GLOB SERPL: 1.3 G/DL
ALP SERPL-CCNC: 161 U/L (ref 39–117)
ALT SERPL W P-5'-P-CCNC: 14 U/L (ref 1–33)
ANION GAP SERPL CALCULATED.3IONS-SCNC: 9.8 MMOL/L (ref 5–15)
AST SERPL-CCNC: 14 U/L (ref 1–32)
ATMOSPHERIC PRESS: 728.5 MMHG
ATMOSPHERIC PRESS: 729.3 MMHG
BASE EXCESS BLDCOA CALC-SCNC: -1.7 MMOL/L (ref -2–2)
BASE EXCESS BLDCOV CALC-SCNC: -2.9 MMOL/L (ref -30–30)
BASOPHILS # BLD AUTO: 0.04 10*3/MM3 (ref 0–0.2)
BASOPHILS NFR BLD AUTO: 0.4 % (ref 0–1.5)
BDY SITE: ABNORMAL
BDY SITE: ABNORMAL
BILIRUB SERPL-MCNC: <0.2 MG/DL (ref 0–1.2)
BLD GP AB SCN SERPL QL: NEGATIVE
BUN SERPL-MCNC: 7 MG/DL (ref 6–20)
BUN/CREAT SERPL: 10.1 (ref 7–25)
CALCIUM SPEC-SCNC: 8.5 MG/DL (ref 8.6–10.5)
CHLORIDE SERPL-SCNC: 105 MMOL/L (ref 98–107)
CO2 BLDA-SCNC: 22.3 MMOL/L (ref 23–27)
CO2 BLDA-SCNC: 27.7 MMOL/L (ref 23–27)
CO2 SERPL-SCNC: 20.2 MMOL/L (ref 22–29)
CREAT SERPL-MCNC: 0.69 MG/DL (ref 0.57–1)
DEPRECATED RDW RBC AUTO: 41.2 FL (ref 37–54)
EGFRCR SERPLBLD CKD-EPI 2021: 120.7 ML/MIN/1.73
EOSINOPHIL # BLD AUTO: 0.06 10*3/MM3 (ref 0–0.4)
EOSINOPHIL NFR BLD AUTO: 0.5 % (ref 0.3–6.2)
ERYTHROCYTE [DISTWIDTH] IN BLOOD BY AUTOMATED COUNT: 14 % (ref 12.3–15.4)
GAS FLOW AIRWAY: 2 LPM
GAS FLOW AIRWAY: 2 LPM
GLOBULIN UR ELPH-MCNC: 2.6 GM/DL
GLUCOSE SERPL-MCNC: 74 MG/DL (ref 65–99)
HCO3 BLDCOA-SCNC: 26.1 MMOL/L (ref 22–28)
HCO3 BLDCOV-SCNC: 21.3 MMOL/L
HCT VFR BLD AUTO: 33.3 % (ref 34–46.6)
HGB BLD-MCNC: 10.9 G/DL (ref 12–15.9)
IMM GRANULOCYTES # BLD AUTO: 0.05 10*3/MM3 (ref 0–0.05)
IMM GRANULOCYTES NFR BLD AUTO: 0.5 % (ref 0–0.5)
LYMPHOCYTES # BLD AUTO: 2.58 10*3/MM3 (ref 0.7–3.1)
LYMPHOCYTES NFR BLD AUTO: 23.3 % (ref 19.6–45.3)
MCH RBC QN AUTO: 26.5 PG (ref 26.6–33)
MCHC RBC AUTO-ENTMCNC: 32.7 G/DL (ref 31.5–35.7)
MCV RBC AUTO: 80.8 FL (ref 79–97)
MODALITY: ABNORMAL
MODALITY: ABNORMAL
MONOCYTES # BLD AUTO: 0.41 10*3/MM3 (ref 0.1–0.9)
MONOCYTES NFR BLD AUTO: 3.7 % (ref 5–12)
NEUTROPHILS NFR BLD AUTO: 7.91 10*3/MM3 (ref 1.7–7)
NEUTROPHILS NFR BLD AUTO: 71.6 % (ref 42.7–76)
NOTIFIED WHO: ABNORMAL
NOTIFIED WHO: ABNORMAL
NRBC BLD AUTO-RTO: 0 /100 WBC (ref 0–0.2)
PCO2 BLDCOA: 53.3 MMHG (ref 43–63)
PCO2 BLDCOV: 35.2 MM HG (ref 35–51.3)
PH BLDCOA: 7.3 PH UNITS (ref 7.18–7.34)
PH BLDCOV: 7.39 PH UNITS (ref 7.26–7.4)
PLATELET # BLD AUTO: 266 10*3/MM3 (ref 140–450)
PMV BLD AUTO: 9.5 FL (ref 6–12)
PO2 BLDCOA: <22.1 MMHG (ref 12–26)
PO2 BLDCOV: 22.1 MM HG (ref 19–39)
POTASSIUM SERPL-SCNC: 3.8 MMOL/L (ref 3.5–5.2)
PROT SERPL-MCNC: 6 G/DL (ref 6–8.5)
RBC # BLD AUTO: 4.12 10*6/MM3 (ref 3.77–5.28)
READ BACK: YES
RH BLD: POSITIVE
SAO2 % BLDCOV: ABNORMAL %
SODIUM SERPL-SCNC: 135 MMOL/L (ref 136–145)
T&S EXPIRATION DATE: NORMAL
TREPONEMA PALLIDUM IGG+IGM AB [PRESENCE] IN SERUM OR PLASMA BY IMMUNOASSAY: NORMAL
WBC NRBC COR # BLD AUTO: 11.05 10*3/MM3 (ref 3.4–10.8)

## 2024-09-27 PROCEDURE — 25010000002 MORPHINE PER 10 MG: Performed by: STUDENT IN AN ORGANIZED HEALTH CARE EDUCATION/TRAINING PROGRAM

## 2024-09-27 PROCEDURE — 86900 BLOOD TYPING SEROLOGIC ABO: CPT | Performed by: STUDENT IN AN ORGANIZED HEALTH CARE EDUCATION/TRAINING PROGRAM

## 2024-09-27 PROCEDURE — 86780 TREPONEMA PALLIDUM: CPT | Performed by: STUDENT IN AN ORGANIZED HEALTH CARE EDUCATION/TRAINING PROGRAM

## 2024-09-27 PROCEDURE — 25810000003 LACTATED RINGERS PER 1000 ML: Performed by: STUDENT IN AN ORGANIZED HEALTH CARE EDUCATION/TRAINING PROGRAM

## 2024-09-27 PROCEDURE — 25010000002 FENTANYL CITRATE (PF) 100 MCG/2ML SOLUTION: Performed by: STUDENT IN AN ORGANIZED HEALTH CARE EDUCATION/TRAINING PROGRAM

## 2024-09-27 PROCEDURE — 25010000002 EPINEPHRINE 1 MG/ML SOLUTION 30 ML VIAL: Performed by: STUDENT IN AN ORGANIZED HEALTH CARE EDUCATION/TRAINING PROGRAM

## 2024-09-27 PROCEDURE — G0432 EIA HIV-1/HIV-2 SCREEN: HCPCS | Performed by: STUDENT IN AN ORGANIZED HEALTH CARE EDUCATION/TRAINING PROGRAM

## 2024-09-27 PROCEDURE — 25010000002 ONDANSETRON PER 1 MG: Performed by: STUDENT IN AN ORGANIZED HEALTH CARE EDUCATION/TRAINING PROGRAM

## 2024-09-27 PROCEDURE — 25010000002 ROPIVACAINE PER 1 MG: Performed by: STUDENT IN AN ORGANIZED HEALTH CARE EDUCATION/TRAINING PROGRAM

## 2024-09-27 PROCEDURE — 25010000002 OXYTOCIN PER 10 UNITS: Performed by: STUDENT IN AN ORGANIZED HEALTH CARE EDUCATION/TRAINING PROGRAM

## 2024-09-27 PROCEDURE — 87340 HEPATITIS B SURFACE AG IA: CPT | Performed by: STUDENT IN AN ORGANIZED HEALTH CARE EDUCATION/TRAINING PROGRAM

## 2024-09-27 PROCEDURE — 25010000002 BUPIVACAINE PF 0.75 % SOLUTION: Performed by: STUDENT IN AN ORGANIZED HEALTH CARE EDUCATION/TRAINING PROGRAM

## 2024-09-27 PROCEDURE — 82803 BLOOD GASES ANY COMBINATION: CPT | Performed by: STUDENT IN AN ORGANIZED HEALTH CARE EDUCATION/TRAINING PROGRAM

## 2024-09-27 PROCEDURE — 86850 RBC ANTIBODY SCREEN: CPT | Performed by: STUDENT IN AN ORGANIZED HEALTH CARE EDUCATION/TRAINING PROGRAM

## 2024-09-27 PROCEDURE — 25810000003 SODIUM CHLORIDE 0.9 % SOLUTION: Performed by: STUDENT IN AN ORGANIZED HEALTH CARE EDUCATION/TRAINING PROGRAM

## 2024-09-27 PROCEDURE — 85025 COMPLETE CBC W/AUTO DIFF WBC: CPT | Performed by: STUDENT IN AN ORGANIZED HEALTH CARE EDUCATION/TRAINING PROGRAM

## 2024-09-27 PROCEDURE — 86901 BLOOD TYPING SEROLOGIC RH(D): CPT | Performed by: STUDENT IN AN ORGANIZED HEALTH CARE EDUCATION/TRAINING PROGRAM

## 2024-09-27 PROCEDURE — 25010000002 KETOROLAC TROMETHAMINE PER 15 MG: Performed by: STUDENT IN AN ORGANIZED HEALTH CARE EDUCATION/TRAINING PROGRAM

## 2024-09-27 PROCEDURE — 25010000002 CLONIDINE PER 1 MG: Performed by: STUDENT IN AN ORGANIZED HEALTH CARE EDUCATION/TRAINING PROGRAM

## 2024-09-27 PROCEDURE — 25010000002 CEFAZOLIN 3 G RECONSTITUTED SOLUTION 1 EACH VIAL: Performed by: STUDENT IN AN ORGANIZED HEALTH CARE EDUCATION/TRAINING PROGRAM

## 2024-09-27 PROCEDURE — 82803 BLOOD GASES ANY COMBINATION: CPT

## 2024-09-27 PROCEDURE — 86803 HEPATITIS C AB TEST: CPT | Performed by: STUDENT IN AN ORGANIZED HEALTH CARE EDUCATION/TRAINING PROGRAM

## 2024-09-27 PROCEDURE — 80053 COMPREHEN METABOLIC PANEL: CPT | Performed by: STUDENT IN AN ORGANIZED HEALTH CARE EDUCATION/TRAINING PROGRAM

## 2024-09-27 PROCEDURE — 25810000003 LACTATED RINGERS SOLUTION: Performed by: STUDENT IN AN ORGANIZED HEALTH CARE EDUCATION/TRAINING PROGRAM

## 2024-09-27 PROCEDURE — 63710000001 ONDANSETRON ODT 4 MG TABLET DISPERSIBLE: Performed by: STUDENT IN AN ORGANIZED HEALTH CARE EDUCATION/TRAINING PROGRAM

## 2024-09-27 PROCEDURE — 88307 TISSUE EXAM BY PATHOLOGIST: CPT

## 2024-09-27 RX ORDER — DIPHENHYDRAMINE HCL 25 MG
25 CAPSULE ORAL EVERY 4 HOURS PRN
Status: DISCONTINUED | OUTPATIENT
Start: 2024-09-27 | End: 2024-09-30 | Stop reason: HOSPADM

## 2024-09-27 RX ORDER — OXYCODONE HYDROCHLORIDE 10 MG/1
10 TABLET ORAL EVERY 4 HOURS PRN
Status: DISCONTINUED | OUTPATIENT
Start: 2024-09-27 | End: 2024-09-30 | Stop reason: HOSPADM

## 2024-09-27 RX ORDER — LIDOCAINE HYDROCHLORIDE 10 MG/ML
0.5 INJECTION, SOLUTION INFILTRATION; PERINEURAL ONCE AS NEEDED
Status: DISCONTINUED | OUTPATIENT
Start: 2024-09-27 | End: 2024-09-27 | Stop reason: HOSPADM

## 2024-09-27 RX ORDER — TRANEXAMIC ACID 10 MG/ML
1000 INJECTION, SOLUTION INTRAVENOUS ONCE AS NEEDED
Status: DISCONTINUED | OUTPATIENT
Start: 2024-09-27 | End: 2024-09-30 | Stop reason: HOSPADM

## 2024-09-27 RX ORDER — MISOPROSTOL 200 UG/1
800 TABLET ORAL ONCE AS NEEDED
Status: DISCONTINUED | OUTPATIENT
Start: 2024-09-27 | End: 2024-09-27 | Stop reason: HOSPADM

## 2024-09-27 RX ORDER — DIPHENHYDRAMINE HYDROCHLORIDE 50 MG/ML
25 INJECTION INTRAMUSCULAR; INTRAVENOUS EVERY 4 HOURS PRN
Status: DISCONTINUED | OUTPATIENT
Start: 2024-09-27 | End: 2024-09-27

## 2024-09-27 RX ORDER — SIMETHICONE 80 MG
80 TABLET,CHEWABLE ORAL 4 TIMES DAILY PRN
Status: DISCONTINUED | OUTPATIENT
Start: 2024-09-27 | End: 2024-09-30 | Stop reason: HOSPADM

## 2024-09-27 RX ORDER — OXYCODONE HYDROCHLORIDE 5 MG/1
5 TABLET ORAL EVERY 4 HOURS PRN
Status: DISCONTINUED | OUTPATIENT
Start: 2024-09-27 | End: 2024-09-30 | Stop reason: HOSPADM

## 2024-09-27 RX ORDER — PHENYLEPHRINE HCL IN 0.9% NACL 1 MG/10 ML
SYRINGE (ML) INTRAVENOUS AS NEEDED
Status: DISCONTINUED | OUTPATIENT
Start: 2024-09-27 | End: 2024-09-27 | Stop reason: SURG

## 2024-09-27 RX ORDER — HYDROMORPHONE HYDROCHLORIDE 1 MG/ML
0.5 INJECTION, SOLUTION INTRAMUSCULAR; INTRAVENOUS; SUBCUTANEOUS
Status: DISCONTINUED | OUTPATIENT
Start: 2024-09-27 | End: 2024-09-27 | Stop reason: HOSPADM

## 2024-09-27 RX ORDER — HYDROXYZINE HYDROCHLORIDE 50 MG/1
50 TABLET, FILM COATED ORAL NIGHTLY PRN
Status: DISCONTINUED | OUTPATIENT
Start: 2024-09-27 | End: 2024-09-30 | Stop reason: HOSPADM

## 2024-09-27 RX ORDER — ONDANSETRON 2 MG/ML
4 INJECTION INTRAMUSCULAR; INTRAVENOUS EVERY 6 HOURS PRN
Status: DISCONTINUED | OUTPATIENT
Start: 2024-09-27 | End: 2024-09-27

## 2024-09-27 RX ORDER — MORPHINE SULFATE 2 MG/ML
2 INJECTION, SOLUTION INTRAMUSCULAR; INTRAVENOUS
Status: DISCONTINUED | OUTPATIENT
Start: 2024-09-27 | End: 2024-09-27

## 2024-09-27 RX ORDER — FAMOTIDINE 10 MG/ML
20 INJECTION, SOLUTION INTRAVENOUS 2 TIMES DAILY
Status: DISCONTINUED | OUTPATIENT
Start: 2024-09-27 | End: 2024-09-27

## 2024-09-27 RX ORDER — AMOXICILLIN 250 MG
1 CAPSULE ORAL 2 TIMES DAILY
Status: DISCONTINUED | OUTPATIENT
Start: 2024-09-27 | End: 2024-09-30 | Stop reason: HOSPADM

## 2024-09-27 RX ORDER — OXYTOCIN/0.9 % SODIUM CHLORIDE 30/500 ML
PLASTIC BAG, INJECTION (ML) INTRAVENOUS
Status: COMPLETED
Start: 2024-09-27 | End: 2024-09-27

## 2024-09-27 RX ORDER — CALCIUM CARBONATE 500 MG/1
1 TABLET, CHEWABLE ORAL EVERY 4 HOURS PRN
Status: DISCONTINUED | OUTPATIENT
Start: 2024-09-27 | End: 2024-09-30 | Stop reason: HOSPADM

## 2024-09-27 RX ORDER — IBUPROFEN 600 MG/1
600 TABLET, FILM COATED ORAL EVERY 6 HOURS
Status: DISCONTINUED | OUTPATIENT
Start: 2024-09-29 | End: 2024-09-30 | Stop reason: HOSPADM

## 2024-09-27 RX ORDER — ONDANSETRON 4 MG/1
4 TABLET, ORALLY DISINTEGRATING ORAL EVERY 8 HOURS PRN
Status: DISCONTINUED | OUTPATIENT
Start: 2024-09-27 | End: 2024-09-30 | Stop reason: HOSPADM

## 2024-09-27 RX ORDER — METHYLERGONOVINE MALEATE 0.2 MG/ML
200 INJECTION INTRAVENOUS ONCE AS NEEDED
Status: DISCONTINUED | OUTPATIENT
Start: 2024-09-27 | End: 2024-09-30 | Stop reason: HOSPADM

## 2024-09-27 RX ORDER — DROPERIDOL 2.5 MG/ML
0.62 INJECTION, SOLUTION INTRAMUSCULAR; INTRAVENOUS
Status: DISCONTINUED | OUTPATIENT
Start: 2024-09-27 | End: 2024-09-27

## 2024-09-27 RX ORDER — SODIUM CHLORIDE, SODIUM LACTATE, POTASSIUM CHLORIDE, CALCIUM CHLORIDE 600; 310; 30; 20 MG/100ML; MG/100ML; MG/100ML; MG/100ML
125 INJECTION, SOLUTION INTRAVENOUS CONTINUOUS
Status: DISCONTINUED | OUTPATIENT
Start: 2024-09-27 | End: 2024-09-27

## 2024-09-27 RX ORDER — BISACODYL 5 MG/1
10 TABLET, DELAYED RELEASE ORAL DAILY PRN
Status: DISCONTINUED | OUTPATIENT
Start: 2024-09-27 | End: 2024-09-30 | Stop reason: HOSPADM

## 2024-09-27 RX ORDER — FAMOTIDINE 10 MG/ML
20 INJECTION, SOLUTION INTRAVENOUS ONCE
Status: COMPLETED | OUTPATIENT
Start: 2024-09-27 | End: 2024-09-27

## 2024-09-27 RX ORDER — SODIUM CHLORIDE 9 MG/ML
40 INJECTION, SOLUTION INTRAVENOUS AS NEEDED
Status: DISCONTINUED | OUTPATIENT
Start: 2024-09-27 | End: 2024-09-27 | Stop reason: HOSPADM

## 2024-09-27 RX ORDER — TRANEXAMIC ACID 10 MG/ML
1000 INJECTION, SOLUTION INTRAVENOUS ONCE AS NEEDED
Status: DISCONTINUED | OUTPATIENT
Start: 2024-09-27 | End: 2024-09-27 | Stop reason: HOSPADM

## 2024-09-27 RX ORDER — MORPHINE SULFATE 4 MG/ML
INJECTION, SOLUTION INTRAMUSCULAR; INTRAVENOUS
Status: COMPLETED | OUTPATIENT
Start: 2024-09-27 | End: 2024-09-27

## 2024-09-27 RX ORDER — KETOROLAC TROMETHAMINE 30 MG/ML
30 INJECTION, SOLUTION INTRAMUSCULAR; INTRAVENOUS ONCE
Status: COMPLETED | OUTPATIENT
Start: 2024-09-27 | End: 2024-09-27

## 2024-09-27 RX ORDER — OXYTOCIN/0.9 % SODIUM CHLORIDE 30/500 ML
999 PLASTIC BAG, INJECTION (ML) INTRAVENOUS ONCE
Status: COMPLETED | OUTPATIENT
Start: 2024-09-27 | End: 2024-09-27

## 2024-09-27 RX ORDER — CARBOPROST TROMETHAMINE 250 UG/ML
250 INJECTION, SOLUTION INTRAMUSCULAR
Status: DISCONTINUED | OUTPATIENT
Start: 2024-09-27 | End: 2024-09-27 | Stop reason: HOSPADM

## 2024-09-27 RX ORDER — PRENATAL VIT/IRON FUM/FOLIC AC 27MG-0.8MG
1 TABLET ORAL DAILY
Status: DISCONTINUED | OUTPATIENT
Start: 2024-09-28 | End: 2024-09-30 | Stop reason: HOSPADM

## 2024-09-27 RX ORDER — MISOPROSTOL 200 UG/1
600 TABLET ORAL ONCE AS NEEDED
Status: DISCONTINUED | OUTPATIENT
Start: 2024-09-27 | End: 2024-09-30 | Stop reason: HOSPADM

## 2024-09-27 RX ORDER — ENOXAPARIN SODIUM 100 MG/ML
40 INJECTION SUBCUTANEOUS EVERY 12 HOURS
Status: DISCONTINUED | OUTPATIENT
Start: 2024-09-28 | End: 2024-09-30 | Stop reason: HOSPADM

## 2024-09-27 RX ORDER — CARBOPROST TROMETHAMINE 250 UG/ML
250 INJECTION, SOLUTION INTRAMUSCULAR ONCE AS NEEDED
Status: DISCONTINUED | OUTPATIENT
Start: 2024-09-27 | End: 2024-09-30 | Stop reason: HOSPADM

## 2024-09-27 RX ORDER — DIPHENHYDRAMINE HCL 25 MG
25 CAPSULE ORAL EVERY 4 HOURS PRN
Status: DISCONTINUED | OUTPATIENT
Start: 2024-09-27 | End: 2024-09-27

## 2024-09-27 RX ORDER — ALUMINA, MAGNESIA, AND SIMETHICONE 2400; 2400; 240 MG/30ML; MG/30ML; MG/30ML
15 SUSPENSION ORAL EVERY 4 HOURS PRN
Status: DISCONTINUED | OUTPATIENT
Start: 2024-09-27 | End: 2024-09-30 | Stop reason: HOSPADM

## 2024-09-27 RX ORDER — SODIUM CHLORIDE 0.9 % (FLUSH) 0.9 %
10 SYRINGE (ML) INJECTION AS NEEDED
Status: DISCONTINUED | OUTPATIENT
Start: 2024-09-27 | End: 2024-09-27 | Stop reason: HOSPADM

## 2024-09-27 RX ORDER — SODIUM CHLORIDE 0.9 % (FLUSH) 0.9 %
10 SYRINGE (ML) INJECTION EVERY 12 HOURS SCHEDULED
Status: DISCONTINUED | OUTPATIENT
Start: 2024-09-27 | End: 2024-09-27 | Stop reason: HOSPADM

## 2024-09-27 RX ORDER — ONDANSETRON 2 MG/ML
4 INJECTION INTRAMUSCULAR; INTRAVENOUS ONCE AS NEEDED
Status: DISCONTINUED | OUTPATIENT
Start: 2024-09-27 | End: 2024-09-27

## 2024-09-27 RX ORDER — METHYLERGONOVINE MALEATE 0.2 MG/ML
200 INJECTION INTRAVENOUS ONCE AS NEEDED
Status: DISCONTINUED | OUTPATIENT
Start: 2024-09-27 | End: 2024-09-27 | Stop reason: HOSPADM

## 2024-09-27 RX ORDER — OXYTOCIN/0.9 % SODIUM CHLORIDE 30/500 ML
125 PLASTIC BAG, INJECTION (ML) INTRAVENOUS CONTINUOUS PRN
Status: DISCONTINUED | OUTPATIENT
Start: 2024-09-27 | End: 2024-09-30 | Stop reason: HOSPADM

## 2024-09-27 RX ORDER — BUPIVACAINE HYDROCHLORIDE 7.5 MG/ML
INJECTION, SOLUTION EPIDURAL; RETROBULBAR
Status: COMPLETED | OUTPATIENT
Start: 2024-09-27 | End: 2024-09-27

## 2024-09-27 RX ORDER — FENTANYL CITRATE 50 UG/ML
INJECTION, SOLUTION INTRAMUSCULAR; INTRAVENOUS AS NEEDED
Status: DISCONTINUED | OUTPATIENT
Start: 2024-09-27 | End: 2024-09-27 | Stop reason: SURG

## 2024-09-27 RX ORDER — OXYTOCIN/0.9 % SODIUM CHLORIDE 30/500 ML
250 PLASTIC BAG, INJECTION (ML) INTRAVENOUS CONTINUOUS
Status: ACTIVE | OUTPATIENT
Start: 2024-09-27 | End: 2024-09-27

## 2024-09-27 RX ORDER — NALOXONE HCL 0.4 MG/ML
0.2 VIAL (ML) INJECTION
Status: DISCONTINUED | OUTPATIENT
Start: 2024-09-27 | End: 2024-09-30 | Stop reason: HOSPADM

## 2024-09-27 RX ORDER — TRANEXAMIC ACID 10 MG/ML
INJECTION, SOLUTION INTRAVENOUS AS NEEDED
Status: DISCONTINUED | OUTPATIENT
Start: 2024-09-27 | End: 2024-09-27 | Stop reason: SURG

## 2024-09-27 RX ORDER — KETOROLAC TROMETHAMINE 15 MG/ML
15 INJECTION, SOLUTION INTRAMUSCULAR; INTRAVENOUS EVERY 6 HOURS
Status: COMPLETED | OUTPATIENT
Start: 2024-09-28 | End: 2024-09-28

## 2024-09-27 RX ADMIN — Medication 100 MCG: at 15:32

## 2024-09-27 RX ADMIN — TRANEXAMIC ACID 1000 MG: 10 INJECTION, SOLUTION INTRAVENOUS at 16:31

## 2024-09-27 RX ADMIN — FAMOTIDINE 20 MG: 10 INJECTION INTRAVENOUS at 15:20

## 2024-09-27 RX ADMIN — Medication 100 MCG: at 15:41

## 2024-09-27 RX ADMIN — OXYCODONE HYDROCHLORIDE 5 MG: 5 TABLET ORAL at 22:16

## 2024-09-27 RX ADMIN — SENNOSIDES AND DOCUSATE SODIUM 1 TABLET: 50; 8.6 TABLET ORAL at 22:05

## 2024-09-27 RX ADMIN — SODIUM CHLORIDE 3000 MG: 900 INJECTION INTRAVENOUS at 15:16

## 2024-09-27 RX ADMIN — Medication 999 ML/HR: at 15:39

## 2024-09-27 RX ADMIN — SODIUM CHLORIDE, POTASSIUM CHLORIDE, SODIUM LACTATE AND CALCIUM CHLORIDE: 600; 310; 30; 20 INJECTION, SOLUTION INTRAVENOUS at 16:51

## 2024-09-27 RX ADMIN — Medication 100 MCG: at 15:24

## 2024-09-27 RX ADMIN — MORPHINE SULFATE 200 MCG: 4 INJECTION, SOLUTION INTRAMUSCULAR; INTRAVENOUS at 15:23

## 2024-09-27 RX ADMIN — ONDANSETRON 4 MG: 4 TABLET, ORALLY DISINTEGRATING ORAL at 23:18

## 2024-09-27 RX ADMIN — FENTANYL CITRATE 50 MCG: 50 INJECTION, SOLUTION INTRAMUSCULAR; INTRAVENOUS at 17:38

## 2024-09-27 RX ADMIN — BUPIVACAINE HYDROCHLORIDE 1.6 ML: 7.5 INJECTION, SOLUTION EPIDURAL; RETROBULBAR at 15:23

## 2024-09-27 RX ADMIN — KETOROLAC TROMETHAMINE 30 MG: 30 INJECTION, SOLUTION INTRAMUSCULAR at 18:51

## 2024-09-27 RX ADMIN — FENTANYL CITRATE 50 MCG: 50 INJECTION, SOLUTION INTRAMUSCULAR; INTRAVENOUS at 16:38

## 2024-09-27 RX ADMIN — SODIUM CHLORIDE, POTASSIUM CHLORIDE, SODIUM LACTATE AND CALCIUM CHLORIDE: 600; 310; 30; 20 INJECTION, SOLUTION INTRAVENOUS at 15:16

## 2024-09-27 RX ADMIN — ROPIVACAINE HYDROCHLORIDE 100 ML: 5 INJECTION EPIDURAL; INFILTRATION; PERINEURAL at 15:56

## 2024-09-27 RX ADMIN — ONDANSETRON 4 MG: 2 INJECTION, SOLUTION INTRAMUSCULAR; INTRAVENOUS at 15:20

## 2024-09-27 NOTE — L&D DELIVERY NOTE
[facility]   Section Operative Note    Patient Name: Yusra Dumont  :  1995  MRN:  [id number]      Date of procedure:  2024        Pre-Operative Dx:   1.  IUP at 38w4d weeks   2. Non-Reassuring Fetal Status         Postoperative Dx:  Same        Procedure: primary    via Pfannensteil      Surgeon: Angeline Duenas MD      Assistant: Lupe Bass CST     Anesthesia: Spinal          Quantitative EBL:      Quantitative Blood Loss (mL): 1117 mL         Specimens: A: Placenta     Findings:                           Amniotic Fluid - meconium stained  Placenta Intact, 3 VC  Band of amniotic membrane draped around fetal shoulder and neck. Additional band of amniotic membrane found to be in a knot around the umbilical cord  Uterus normal  Tubes and ovaries normal bilaterally              Infant:           Gender: Viable female  infant     Weight: 3320 g (7 lb 5.1 oz)     Apgars: 1  @ 1 minute /     9  @ 5 minutes                 Indication for C/Section:     Non-Reassuring Fetal Status               Procedure Details:    The patient was taken to the operating room where spinal anesthesia was placed and found to be adequate.  She was then placed in the dorsal supine position with left lateral tilt and prepped and draped in the usual sterile fashion. Jones catheter was placed. Time out was performed. A Pfannenstiel skin incision was then made with the scalpel and carried through to the underlying layer of fascia. The fascia was incised in the midline and the incision extended laterally bluntly. The superior aspect of the fascial incision was was then grasped with the Kocher clamps, elevated, and the underlying rectus muscles dissected off bluntly. Attention was then turned to the inferior aspect of this incision, which, in similar fashion, was grasped with the Kocher clamps, and the rectus muscles dissected off bluntly. The rectus muscles were then  in the midline, and the  peritoneum identified, tented up, and entered  bluntly. The peritoneal incision was then extended superiorly and inferiorly with good visualization of the bladder.     The bladder blade was then inserted and the vesicouterine peritoneum identified well-distal to the site of desired hysterotomy and thus no bladder flap was created. The lower uterine segment incised in a transverse fashion with the scalpel. The uterine incision was then extended superolaterally bluntly. The bladder blade was removed and the infant's head delivered atraumatically, followed by delivery of the rest of the infant's body. There were no nuchal cords. Band of amniotic membrane was draped around fetal shoulder and neck. Additional band of amniotic membrane found to be in a knot around the umbilical cord.  The umbilical cord was clamped and cut and the infant was passed off to waiting pediatricians. Cord blood and gases (arterial and venous) were obtained and sent to pathology.     The placenta was then removed manually, the uterus exteriorized, and cleared of all clot and debris. The uterine incision was repaired with 0-Monocryl in a running locked fashion. A second imbricating layer of the same suture was used to obtain excellent hemostasis. Multiple figure-of-eight sutures were placed along the hysterotomy for additional hemostasis. The posterior cul de sac was cleared of all clot and debris. A denuded area of posterior uterine serosa at the lower uterine segment was made hemostatic with a figure-of-eight stitch of 0-Chromic. The uterus was returned to the abdomen. On inspection, the uterine serosa was oozy in pinpoint areas along the anterior wall and fundus with no defects noted. The paracolic gutters were cleared of all clot and debris. The Bovie electrocautery was used to obtain hemostasis. Additionally, Perclot Hemostatic powder was placed along the hysterotomy. The uterus was re-examined and found to be hemostatic. We then turned our  attention to the muscle and fasical edges. Small bleeders were cauterized with Bovie electrocautery. The fascia was reapproximated with 0-PDS in a running fashion. The subcutaneous tissue was infiltrated with an anesthetic solution containing ropivacaine, clonidine, and epinephrine then reapproximated with 3-0 Monocryl. The skin was closed with 4-0 Monocryl.    Instrument, sponge, and needle counts were correct prior the abdominal closure and at the conclusion of the case. Mother and baby  to recovery room in stable condition.            Complications:     Postpartum hemorrhage > 1gm of IV TXA was administered          Antibiotics: cefazolin (Ancef)              Angeline Duenas MD  9/27/2024  18:52 EDT

## 2024-09-28 LAB
BASOPHILS # BLD AUTO: 0.03 10*3/MM3 (ref 0–0.2)
BASOPHILS NFR BLD AUTO: 0.3 % (ref 0–1.5)
DEPRECATED RDW RBC AUTO: 40.9 FL (ref 37–54)
EOSINOPHIL # BLD AUTO: 0.02 10*3/MM3 (ref 0–0.4)
EOSINOPHIL NFR BLD AUTO: 0.2 % (ref 0.3–6.2)
ERYTHROCYTE [DISTWIDTH] IN BLOOD BY AUTOMATED COUNT: 14.2 % (ref 12.3–15.4)
HBV SURFACE AG SERPL QL IA: NORMAL
HCT VFR BLD AUTO: 30.9 % (ref 34–46.6)
HCV AB SER QL: NORMAL
HGB BLD-MCNC: 10.2 G/DL (ref 12–15.9)
HIV 1+2 AB+HIV1 P24 AG SERPL QL IA: NORMAL
IMM GRANULOCYTES # BLD AUTO: 0.04 10*3/MM3 (ref 0–0.05)
IMM GRANULOCYTES NFR BLD AUTO: 0.4 % (ref 0–0.5)
LYMPHOCYTES # BLD AUTO: 0.74 10*3/MM3 (ref 0.7–3.1)
LYMPHOCYTES NFR BLD AUTO: 6.5 % (ref 19.6–45.3)
MCH RBC QN AUTO: 26.6 PG (ref 26.6–33)
MCHC RBC AUTO-ENTMCNC: 33 G/DL (ref 31.5–35.7)
MCV RBC AUTO: 80.5 FL (ref 79–97)
MONOCYTES # BLD AUTO: 0.44 10*3/MM3 (ref 0.1–0.9)
MONOCYTES NFR BLD AUTO: 3.9 % (ref 5–12)
NEUTROPHILS NFR BLD AUTO: 10.06 10*3/MM3 (ref 1.7–7)
NEUTROPHILS NFR BLD AUTO: 88.7 % (ref 42.7–76)
NRBC BLD AUTO-RTO: 0 /100 WBC (ref 0–0.2)
PLATELET # BLD AUTO: 210 10*3/MM3 (ref 140–450)
PMV BLD AUTO: 9.6 FL (ref 6–12)
RBC # BLD AUTO: 3.84 10*6/MM3 (ref 3.77–5.28)
WBC NRBC COR # BLD AUTO: 11.33 10*3/MM3 (ref 3.4–10.8)

## 2024-09-28 PROCEDURE — 25010000002 KETOROLAC TROMETHAMINE PER 15 MG: Performed by: STUDENT IN AN ORGANIZED HEALTH CARE EDUCATION/TRAINING PROGRAM

## 2024-09-28 PROCEDURE — 25010000002 ENOXAPARIN PER 10 MG: Performed by: STUDENT IN AN ORGANIZED HEALTH CARE EDUCATION/TRAINING PROGRAM

## 2024-09-28 PROCEDURE — 85025 COMPLETE CBC W/AUTO DIFF WBC: CPT | Performed by: STUDENT IN AN ORGANIZED HEALTH CARE EDUCATION/TRAINING PROGRAM

## 2024-09-28 RX ADMIN — ENOXAPARIN SODIUM 40 MG: 100 INJECTION SUBCUTANEOUS at 18:31

## 2024-09-28 RX ADMIN — KETOROLAC TROMETHAMINE 15 MG: 15 INJECTION, SOLUTION INTRAMUSCULAR; INTRAVENOUS at 18:31

## 2024-09-28 RX ADMIN — KETOROLAC TROMETHAMINE 15 MG: 15 INJECTION, SOLUTION INTRAMUSCULAR; INTRAVENOUS at 07:08

## 2024-09-28 RX ADMIN — SENNOSIDES AND DOCUSATE SODIUM 1 TABLET: 50; 8.6 TABLET ORAL at 20:12

## 2024-09-28 RX ADMIN — OXYCODONE HYDROCHLORIDE 10 MG: 10 TABLET ORAL at 23:56

## 2024-09-28 RX ADMIN — OXYCODONE HYDROCHLORIDE 10 MG: 10 TABLET ORAL at 18:31

## 2024-09-28 RX ADMIN — SENNOSIDES AND DOCUSATE SODIUM 1 TABLET: 50; 8.6 TABLET ORAL at 08:42

## 2024-09-28 RX ADMIN — Medication 1 TABLET: at 08:42

## 2024-09-28 RX ADMIN — OXYCODONE HYDROCHLORIDE 5 MG: 5 TABLET ORAL at 12:22

## 2024-09-28 RX ADMIN — KETOROLAC TROMETHAMINE 15 MG: 15 INJECTION, SOLUTION INTRAMUSCULAR; INTRAVENOUS at 01:12

## 2024-09-28 RX ADMIN — KETOROLAC TROMETHAMINE 15 MG: 15 INJECTION, SOLUTION INTRAMUSCULAR; INTRAVENOUS at 13:02

## 2024-09-28 NOTE — OP NOTE
[facility]   Section Operative Note     Patient Name: Yusra Dumont  :  1995  MRN:  [id number]        Date of procedure:             2024          Pre-Operative Dx:   1.  IUP at 38w4d weeks   2. Non-Reassuring Fetal Status           Postoperative Dx:  Same          Procedure: primary    via Pfannensteil       Surgeon: Angeline Duenas MD       Assistant: Lupe Bass CST      Anesthesia: Spinal             Quantitative EBL:        Quantitative Blood Loss (mL): 1117 mL            Specimens: A: Placenta      Findings:                            Amniotic Fluid - meconium stained  Placenta Intact, 3 VC  Band of amniotic membrane draped around fetal shoulder and neck. Additional band of amniotic membrane found to be in a knot around the umbilical cord  Uterus normal  Tubes and ovaries normal bilaterally                    Infant:                        Gender: Viable female  infant      Weight: 3320 g (7 lb 5.1 oz)      Apgars: 1  @ 1 minute /       9  @ 5 minutes                         Indication for C/Section:     Non-Reassuring Fetal Status              Procedure Details:    The patient was taken to the operating room where spinal anesthesia was placed and found to be adequate.  She was then placed in the dorsal supine position with left lateral tilt and prepped and draped in the usual sterile fashion. Jones catheter was placed. Time out was performed. A Pfannenstiel skin incision was then made with the scalpel and carried through to the underlying layer of fascia. The fascia was incised in the midline and the incision extended laterally bluntly. The superior aspect of the fascial incision was was then grasped with the Kocher clamps, elevated, and the underlying rectus muscles dissected off bluntly. Attention was then turned to the inferior aspect of this incision, which, in similar fashion, was grasped with the Kocher clamps, and the rectus muscles dissected off bluntly. The  rectus muscles were then  in the midline, and the peritoneum identified, tented up, and entered  bluntly. The peritoneal incision was then extended superiorly and inferiorly with good visualization of the bladder.      The bladder blade was then inserted and the vesicouterine peritoneum identified well-distal to the site of desired hysterotomy and thus no bladder flap was created. The lower uterine segment incised in a transverse fashion with the scalpel. The uterine incision was then extended superolaterally bluntly. The bladder blade was removed and the infant's head delivered atraumatically, followed by delivery of the rest of the infant's body. There were no nuchal cords. Band of amniotic membrane was draped around fetal shoulder and neck. Additional band of amniotic membrane found to be in a knot around the umbilical cord.  The umbilical cord was clamped and cut and the infant was passed off to waiting pediatricians. Cord blood and gases (arterial and venous) were obtained and sent to pathology.     The placenta was then removed manually, the uterus exteriorized, and cleared of all clot and debris. The uterine incision was repaired with 0-Monocryl in a running locked fashion. A second imbricating layer of the same suture was used to obtain excellent hemostasis. Multiple figure-of-eight sutures were placed along the hysterotomy for additional hemostasis. The posterior cul de sac was cleared of all clot and debris. A denuded area of posterior uterine serosa at the lower uterine segment was made hemostatic with a figure-of-eight stitch of 0-Chromic. The uterus was returned to the abdomen. On inspection, the uterine serosa was oozy in pinpoint areas along the anterior wall and fundus with no defects noted. The paracolic gutters were cleared of all clot and debris. The Bovie electrocautery was used to obtain hemostasis. Additionally, Perclot Hemostatic powder was placed along the hysterotomy. The uterus was  re-examined and found to be hemostatic. We then turned our attention to the muscle and fasical edges. Small bleeders were cauterized with Bovie electrocautery. The fascia was reapproximated with 0-PDS in a running fashion. The subcutaneous tissue was infiltrated with an anesthetic solution containing ropivacaine, clonidine, and epinephrine then reapproximated with 3-0 Monocryl. The skin was closed with 4-0 Monocryl.     Instrument, sponge, and needle counts were correct prior the abdominal closure and at the conclusion of the case. Mother and baby  to recovery room in stable condition.               Complications:     Postpartum hemorrhage > 1gm of IV TXA was administered           Antibiotics: cefazolin (Ancef)                 Angeline Duenas MD  9/27/2024  18:52 EDT

## 2024-09-28 NOTE — H&P
[facility]- Labor & Delivery History and Physical      Patient Name: Yusra Dumont  YOB: 1995  MRN: [id number]      Chief Complaint   Patient presents with    Non-stress Test     2/8 BPP in office, prolonged monitoring       Subjective     Patient is a 28 y.o. female  currently at 38w3d, who presents  with abnormal ANT (BPP 2/8 in office) and nonreassuring FHR tracing. Overall BPP 2/10. Recommend proceeding with urgent delivery via  section.     She feels some irregular ctx. Denies LOF, VB. Endorses good FM.  Denies HA, vision changes, SOA/chest pain, RUQ/epigastric pain    Her prenatal care is complicated by:  - Insufficient Fetal Fraction on cfDNA x2  - Obesity    The following portions of the patients history were reviewed and updated as appropriate: current medications, allergies, past medical history, past surgical history, past family history, and past social history .       Prenatal Information:  External Prenatal Results       Pregnancy Outside Results - Transcribed From Office Records - See Scanned Records For Details       Test Value Date Time    ABO  O  24 1504    Rh  Positive  24 1504    Antibody Screen  Negative  24 1504    Varicella IgG       Rubella ^ Immune  24     Hgb  10.2 g/dL 24 0541       10.9 g/dL 24 1504       11.8 g/dL 24 2145    Hct  30.9 % 24 0541       33.3 % 24 1504       37.6 % 24 2145    HgB A1c        1h GTT       3h GTT Fasting       3h GTT 1 hour       3h GTT 2 hour       3h GTT 3 hour        Gonorrhea (discrete)       Chlamydia (discrete)       RPR       Syphils cascade: TP-Ab (FTA)  Non-Reactive  24 1504    TP-Ab  Non-Reactive  24 1504    TP-Ab (EIA)       TPPA       HBsAg  Non-Reactive  24      ^ Negative  24     Herpes Simplex Virus PCR       Herpes Simplex VIrus Culture       HIV  Non-Reactive  24      ^ Non-Reactive  24      Hep C RNA Quant PCR       Hep C Antibody  Non-Reactive  24 2241      ^ Non Reactive  24     AFP       NIPT       Cystic Fibroisis        Group B Strep ^ Negative  24     GBS Susceptibility to Clindamycin       GBS Susceptibility to Erythromycin       Fetal Fibronectin       Genetic Testing, Maternal Blood                 Past OB History:     OB History    Para Term  AB Living   1 1 1 0 0 1   SAB IAB Ectopic Molar Multiple Live Births   0 0 0 0 0 1      # Outcome Date GA Lbr Rafa/2nd Weight Sex Type Anes PTL Lv   1 Term 24 38w4d  3320 g (7 lb 5.1 oz) F CS-LTranv Spinal N SUDHEER      Birth Comments: panda or 1      Complications: Fetal Intolerance      Name: Michael Dumont      Apgar1: 1  Apgar5: 9       Past Medical History: Past Medical History:   Diagnosis Date    Anxiety attack     Dizziness     GERD (gastroesophageal reflux disease)     Headaches, cluster     History of medical problems BIlateral club foot surgery, Knee surgery    Irritable bowel syndrome     Low back pain     Migraine     Numbness and tingling     Peripheral neuropathy     PONV (postoperative nausea and vomiting)     Tension headache       Past Surgical History Past Surgical History:   Procedure Laterality Date     SECTION N/A 2024    Procedure:  SECTION PRIMARY;  Surgeon: Angeline Duenas MD;  Location: [facility] LABOR DELIVERY;  Service: Obstetrics/Gynecology;  Laterality: N/A;    CLUB FOOT RELEASE      4/5 sugeries all under the age of 5    ENDOSCOPY N/A 2019    Procedure: ESOPHAGOGASTRODUODENOSCOPY with BX;  Surgeon: Fabio Peres MD;  Location: [facility] ENDOSCOPY;  Service: Gastroenterology    EPIDURAL BLOCK      KNEE SURGERY Left     scope, ligament repair    LAMINECTOMY  2019    LUMBAR DISCECTOMY Right 2019    Procedure: Right L3-4 laminectomy and discectomy with metrx;  Surgeon: Dean Putnam MD;  Location: [facility] MAIN OR;  Service: Neurosurgery     SACROILIAC JOINT INJECTION Right 10/19/2023    Procedure: SACROILIAC INJECTION;  Surgeon: Dmitry Coronado MD;  Location: [facility] MAIN OR;  Service: Pain Management;  Laterality: Right;    TONSILLECTOMY      UPPER GASTROINTESTINAL ENDOSCOPY  approx 2014    normal per pt       Family History: Family History   Problem Relation Age of Onset    Lung cancer Maternal Grandmother     Mental illness Maternal Grandmother     Hyperlipidemia Maternal Grandmother     Heart disease Maternal Grandfather       Social History:  reports that she quit smoking about 3 years ago. Her smoking use included cigarettes. She started smoking about 8 years ago. She has a 1.3 pack-year smoking history. She has been exposed to tobacco smoke. She has never used smokeless tobacco.   reports that she does not currently use alcohol.   reports no history of drug use.        General ROS:   Review of Systems   Constitutional:  Negative for chills and fever.   Respiratory:  Negative for cough and shortness of breath.    Cardiovascular:  Negative for chest pain and palpitations.   Gastrointestinal:  Negative for abdominal pain, constipation, diarrhea, nausea and vomiting.   Genitourinary:  Negative for dysuria.        Objective       Vital Signs Range for the last 24 hours  Temperature: Temp:  [97.9 °F (36.6 °C)-99.1 °F (37.3 °C)] 98.7 °F (37.1 °C)   Temp Source: Temp src: Oral   BP: BP: (101-144)/(56-94) 125/76   Pulse: Heart Rate:  [] 102   Respirations: Resp:  [16-18] 18   SPO2: SpO2:  [98 %-100 %] 99 %   O2 Amount (l/min):     O2 Devices Device (Oxygen Therapy): room air   Weight:         Labs on Admission:  Results from last 7 days   Lab Units 09/28/24  0541 09/27/24  1504   WBC 10*3/mm3 11.33* 11.05*   HEMOGLOBIN g/dL 10.2* 10.9*   HEMATOCRIT % 30.9* 33.3*   PLATELETS 10*3/mm3 210 266           Physical Examination:   Physical Exam  Vitals reviewed.   Constitutional:       General: She is not in acute distress.     Appearance: Normal  appearance.   HENT:      Head: Normocephalic and atraumatic.   Eyes:      Extraocular Movements: Extraocular movements intact.      Pupils: Pupils are equal, round, and reactive to light.   Cardiovascular:      Rate and Rhythm: Normal rate and regular rhythm.      Pulses: Normal pulses.      Heart sounds: Normal heart sounds.   Pulmonary:      Effort: Pulmonary effort is normal.      Breath sounds: Normal breath sounds.   Abdominal:      Tenderness: There is no abdominal tenderness. There is no guarding.      Comments: Gravid; Fundal height appropriate for GA   Skin:     General: Skin is warm and dry.   Neurological:      General: No focal deficit present.      Mental Status: She is alert and oriented to person, place, and time.   Psychiatric:         Mood and Affect: Mood normal.            Fetal Heart Rate Assessment   Method: Fetal HR Assessment Method: external   Beats/min: Fetal HR (beats/min): 145   Baseline: Fetal HR Baseline: normal range   Variability: Fetal HR Variability: moderate (amplitude range 6 to 25 bpm)   Accels: Fetal HR Accelerations: absent   Decels: Fetal HR Decelerations: prolonged   Tracing Category:       Assessment & Plan       Pregnancy    Status post primary low transverse  section      28 y.o. female  at 38w3d sent down to L&D for evaluation of BPP 2/8. Nonreassuring FHR tracing > BPP 2/10. Recommend to proceed with delivery urgently via primary  at this time. Questions answered, pt amenable.           Angeline Duenas MD  [facility]

## 2024-09-28 NOTE — PLAN OF CARE
Problem: Adult Inpatient Plan of Care  Goal: Plan of Care Review  Outcome: Progressing  Flowsheets (Taken 9/28/2024 0306)  Outcome Evaluation: VSS, fundus firm and bleeding WDL. Catheter is in place and will be removed at 0600, SCD are on. Pt has ambulated once ready and complained of no dizziness or light headedness, Pain is being controlled with scheduled toradol and PRN shanta 5mg. Mom is breastfeeding and bonding with baby.  Goal: Patient-Specific Goal (Individualized)  Outcome: Progressing  Goal: Absence of Hospital-Acquired Illness or Injury  Outcome: Progressing  Intervention: Identify and Manage Fall Risk  Recent Flowsheet Documentation  Taken 9/28/2024 0240 by Leticia Templeton RN  Safety Promotion/Fall Prevention: safety round/check completed  Taken 9/28/2024 0112 by Leticia Templeton RN  Safety Promotion/Fall Prevention: safety round/check completed  Taken 9/28/2024 0000 by Leticia Templeton RN  Safety Promotion/Fall Prevention: safety round/check completed  Taken 9/27/2024 2351 by Leticia Templeton RN  Safety Promotion/Fall Prevention: safety round/check completed  Taken 9/27/2024 2249 by Leticia Templeton RN  Safety Promotion/Fall Prevention: safety round/check completed  Taken 9/27/2024 2217 by Leticia Templeton RN  Safety Promotion/Fall Prevention: safety round/check completed  Taken 9/27/2024 2145 by Leticia Templeton RN  Safety Promotion/Fall Prevention: safety round/check completed  Taken 9/27/2024 2045 by Leticia Templeton RN  Safety Promotion/Fall Prevention: safety round/check completed  Taken 9/27/2024 2026 by Leticia Templeton RN  Safety Promotion/Fall Prevention: safety round/check completed  Taken 9/27/2024 2015 by Leticia Templeton RN  Safety Promotion/Fall Prevention: safety round/check completed  Intervention: Prevent and Manage VTE (Venous Thromboembolism) Risk  Recent Flowsheet Documentation  Taken 9/28/2024 0112 by Leticia Templeton RN  Activity Management: (first time ambulating)   ambulated in room    ambulated to bathroom   back to bed  VTE Prevention/Management:   bilateral   sequential compression devices on  Taken 2024 2351 by Leticia Templeton RN  VTE Prevention/Management:   bilateral   sequential compression devices on  Taken 2024 by Leticia Templeton RN  VTE Prevention/Management:   bilateral   sequential compression devices on  Taken 2024 by Leticia Templeton RN  VTE Prevention/Management:   bilateral   sequential compression devices on  Taken 2024 by Leticia Templeton RN  VTE Prevention/Management:   sequential compression devices on   bilateral  Taken 2024 by Leticia Templeton RN  VTE Prevention/Management:   bilateral   sequential compression devices on  Goal: Optimal Comfort and Wellbeing  Outcome: Progressing  Intervention: Provide Person-Centered Care  Recent Flowsheet Documentation  Taken 2024 011 by Leticia Templeton RN  Trust Relationship/Rapport:   care explained   questions answered   questions encouraged  Taken 2024 2351 by Leticia Templeton RN  Trust Relationship/Rapport:   care explained   questions encouraged   questions answered  Taken 2024 by Leticia Templeton RN  Trust Relationship/Rapport:   care explained   questions answered   questions encouraged   reassurance provided  Taken 2024 by Leticia Templeton RN  Trust Relationship/Rapport:   care explained   questions encouraged  Taken 2024 by Leticia Templeton RN  Trust Relationship/Rapport:   care explained   choices provided   questions answered   questions encouraged  Taken 2024 by Leticia Templeton RN  Trust Relationship/Rapport:   care explained   questions answered   questions encouraged  Goal: Readiness for Transition of Care  Outcome: Progressing     Problem: Pain (Postpartum  Delivery)  Goal: Acceptable Pain Control  Outcome: Progressing     Problem: Bleeding (Postpartum  Delivery)  Goal: Hemostasis  Outcome: Progressing   Goal Outcome  Evaluation:              Outcome Evaluation: VSS, fundus firm and bleeding WDL. Catheter is in place and will be removed at 0600, SCD are on. Pt has ambulated once ready and complained of no dizziness or light headedness, Pain is being controlled with scheduled toradol and PRN shanta 5mg. Mom is breastfeeding and bonding with baby.

## 2024-09-28 NOTE — LACTATION NOTE
Pt reports baby is BF on left breast for 10-15 min each feeding. Pt reports baby wont take right breast. Discussed pumping at least right breast every feeding and supplementing pumped colostrum. Pt will have someone bring in her personal breast pump today. Encouraged pt to call  for latch assistance.    Lactation Consult Note    Evaluation Completed: 2024 10:25 EDT  Patient Name: Yusra Dumont  :  1995  MRN:  [id number]     REFERRAL  INFORMATION:                                         DELIVERY HISTORY:        Skin to skin initiation date/time:      Skin to skin end date/time:           MATERNAL ASSESSMENT:                               INFANT ASSESSMENT:  Information for the patient's :  Tim Michael [[id number]]   No past medical history on file.                                                                                                   MATERNAL INFANT FEEDING:                                                                       EQUIPMENT TYPE:                                 BREAST PUMPING:          LACTATION REFERRALS:

## 2024-09-28 NOTE — LACTATION NOTE
This note was copied from a baby's chart.  P1T    Infant born by .  Patient reports infant latched after delivery.  Educated on trying to feed infant every 2-3 hours.  Demonstrated hand expression technique and colostrum present.  Patient has PBP (Spectra) but may need a smaller flange size.  Encouraged to call for assistance with next feeding.  LC number on WB.

## 2024-09-28 NOTE — PROGRESS NOTES
[facility]   PROGRESS NOTE    Patient Name: Yusra Dumont  :  1995  MRN:  [id number]      Post-Op Day 1 S/P    Delivered a female infant.  Subjective     Patient reports:    Pain is well controlled. Voiding and ambulating without difficulty.    Tolerating po. Lochia normal.       The patient plans to breastfeed.         Objective       Vitals: Vital Signs Range for the last 24 hours  Temperature: Temp:  [97.9 °F (36.6 °C)-99.1 °F (37.3 °C)] 98.2 °F (36.8 °C)   Temp Source: Temp src: Oral   BP: BP: (101-144)/(56-94) 135/71   Pulse: Heart Rate:  [] 107   Respirations: Resp:  [16-18] 17         Intake/Output Summary (Last 24 hours) at 2024 1746  Last data filed at 2024 0555  Gross per 24 hour   Intake --   Output 650 ml   Net -650 ml                                              Physical Exam     General Alert and awake, in NAD      CV Regular rate and rhythm      Lungs clear to auscultation bilaterally        Abdomen Soft, non-distended, fundus firm, below umbilicus, appropriately tender      Incision  Dressing clean, dry and intact.      Extremities  no edema, calves NT               LABS: Results from last 7 days   Lab Units 24  0541 24  1504   WBC 10*3/mm3 11.33* 11.05*   HEMOGLOBIN g/dL 10.2* 10.9*   HEMATOCRIT % 30.9* 33.3*   PLATELETS 10*3/mm3 210 266         Prenatal labs results reviewed:  Yes     Rh Status:    RH type   Date Value Ref Range Status   2024 Positive  Final                       Assessment & Plan   :     1. POD 1 S/P C/S: Hemodynamically stable.  Doing well.  Continue routine care.     2. Female infant well at bedside. Birth complicated by band of amniotic membrane around shoulder and neck as well as umbilical cord.      Pregnancy    Status post primary low transverse  section          Joyce Guzman MD  2024  17:46 EDT

## 2024-09-29 PROCEDURE — 25010000002 ENOXAPARIN PER 10 MG: Performed by: STUDENT IN AN ORGANIZED HEALTH CARE EDUCATION/TRAINING PROGRAM

## 2024-09-29 RX ADMIN — Medication 1 TABLET: at 08:34

## 2024-09-29 RX ADMIN — SENNOSIDES AND DOCUSATE SODIUM 1 TABLET: 50; 8.6 TABLET ORAL at 08:34

## 2024-09-29 RX ADMIN — ENOXAPARIN SODIUM 40 MG: 100 INJECTION SUBCUTANEOUS at 06:34

## 2024-09-29 RX ADMIN — ENOXAPARIN SODIUM 40 MG: 100 INJECTION SUBCUTANEOUS at 18:37

## 2024-09-29 RX ADMIN — IBUPROFEN 600 MG: 600 TABLET, FILM COATED ORAL at 01:44

## 2024-09-29 RX ADMIN — SENNOSIDES AND DOCUSATE SODIUM 1 TABLET: 50; 8.6 TABLET ORAL at 21:19

## 2024-09-29 RX ADMIN — IBUPROFEN 600 MG: 600 TABLET, FILM COATED ORAL at 06:33

## 2024-09-29 RX ADMIN — OXYCODONE HYDROCHLORIDE 10 MG: 10 TABLET ORAL at 12:23

## 2024-09-29 RX ADMIN — IBUPROFEN 600 MG: 600 TABLET, FILM COATED ORAL at 12:22

## 2024-09-29 NOTE — PROGRESS NOTES
[facility]   PROGRESS NOTE    Patient Name: Yusra Dumont  :  1995  MRN:  [id number]      Post-Op Day 2 S/P   Subjective     Patient reports:   Doing well. Her pain is well controlled with PO pain medication. She is tolerating PO, ambulating, and voiding without issue. Reports normal lochia. Passing Flatus. Mood stable- denies SI, HI. Lochia normal.       Objective       Vitals: Vital Signs Range for the last 24 hours  Temperature: Temp:  [98 °F (36.7 °C)-98.4 °F (36.9 °C)] 98 °F (36.7 °C)       BP: BP: (120-135)/(63-71) 120/63   Pulse: Heart Rate:  [] 94   Respirations: Resp:  [16-17] 17             Physical Exam     General Alert       Abdomen Soft, non-distended, fundus firm, U-2 below umbilicus, appropriately tender      Incision  NUNO dressing in place without drainage      Extremities Calves NT bilaterally                Assessment & Plan  :     POD 2  -  Doing well.  Continue usual cares.           Pregnancy    Status post primary low transverse  section               Angeline Duenas MD  2024  10:13 EDT

## 2024-09-29 NOTE — PLAN OF CARE
Goal Outcome Evaluation:  Plan of Care Reviewed With: patient      VS stable. Hemodynamically stable. Denise dressing remains in place. Voids without difficulty. Pain controlled with po meds. + bonding with . Ambulates without difficulty.

## 2024-09-29 NOTE — LACTATION NOTE
Pt reports baby mostly BF on left breast. She reports she hasn't really been pumping. Encouraged to offer both breasts  with each BF at least every 2-3 hours. Encouraged pt to pump after BF and supplement all pumped colostrum to baby. Pt reports baby lost 7% weight loss so far. Discussed possibly supplementing with donor breast milk if needed. Encouraged to call  for latch assist    Lactation Consult Note    Evaluation Completed: 2024 13:49 EDT  Patient Name: Yusra Dumont  :  1995  MRN:  [id number]     REFERRAL  INFORMATION:                                         DELIVERY HISTORY:        Skin to skin initiation date/time:      Skin to skin end date/time:           MATERNAL ASSESSMENT:                               INFANT ASSESSMENT:  Information for the patient's :  Michael Dumont [[id number]]   No past medical history on file.                                                                                                   MATERNAL INFANT FEEDING:                                                                       EQUIPMENT TYPE:                                 BREAST PUMPING:          LACTATION REFERRALS:

## 2024-09-29 NOTE — PLAN OF CARE
Problem: Adult Inpatient Plan of Care  Goal: Plan of Care Review  Outcome: Progressing  Flowsheets (Taken 9/29/2024 0326)  Outcome Evaluation: VSS. fundus firm and bleeding WDL. pt is up ad kurt and encouraged tp walk around frequently to help with stiffness. Pt has been complaining of stiffned and a burning sensation from c/s incision. Pt given ibuprofen and shanta 10 to help with pain. Mom is breast feeding and at times you can tell the pt is overwhelmed, education pt on importance on taking time for herself.  Goal: Patient-Specific Goal (Individualized)  Outcome: Progressing  Goal: Absence of Hospital-Acquired Illness or Injury  Outcome: Progressing  Intervention: Identify and Manage Fall Risk  Recent Flowsheet Documentation  Taken 9/29/2024 0144 by Leticia Templeton RN  Safety Promotion/Fall Prevention: safety round/check completed  Taken 9/29/2024 0105 by Leticia Templeton RN  Safety Promotion/Fall Prevention: safety round/check completed  Taken 9/29/2024 0000 by Leticia Templeton RN  Safety Promotion/Fall Prevention: safety round/check completed  Taken 9/28/2024 2232 by Leticia Templeton RN  Safety Promotion/Fall Prevention: safety round/check completed  Taken 9/28/2024 2010 by Leticia Templeton RN  Safety Promotion/Fall Prevention: safety round/check completed  Intervention: Prevent and Manage VTE (Venous Thromboembolism) Risk  Recent Flowsheet Documentation  Taken 9/28/2024 2010 by Leitcia Templeton RN  VTE Prevention/Management:   bilateral   sequential compression devices off  Goal: Optimal Comfort and Wellbeing  Outcome: Progressing  Intervention: Monitor Pain and Promote Comfort  Recent Flowsheet Documentation  Taken 9/29/2024 0144 by Leticia Templeton RN  Pain Management Interventions: see MAR  Intervention: Provide Person-Centered Care  Recent Flowsheet Documentation  Taken 9/28/2024 2010 by Leticia Templeton RN  Trust Relationship/Rapport:   care explained   questions answered   questions encouraged   reassurance  provided  Goal: Readiness for Transition of Care  Outcome: Progressing     Problem: Bleeding (Postpartum  Delivery)  Goal: Hemostasis  Outcome: Progressing     Problem: Pain (Postpartum  Delivery)  Goal: Acceptable Pain Control  Outcome: Progressing  Intervention: Prevent or Manage Pain  Recent Flowsheet Documentation  Taken 2024 0144 by Leticia Templeton RN  Pain Management Interventions: see MAR   Goal Outcome Evaluation:              Outcome Evaluation: VSS. fundus firm and bleeding WDL. pt is up ad kurt and encouraged tp walk around frequently to help with stiffness. Pt has been complaining of stiffned and a burning sensation from c/s incision. Pt given ibuprofen and shanta 10 to help with pain. Mom is breast feeding and at times you can tell the pt is overwhelmed, education pt on importance on taking time for herself.

## 2024-09-30 ENCOUNTER — HOSPITAL ENCOUNTER (OUTPATIENT)
Dept: LABOR AND DELIVERY | Facility: HOSPITAL | Age: 29
Discharge: HOME | End: 2024-09-30
Payer: COMMERCIAL

## 2024-09-30 VITALS
BODY MASS INDEX: 47.3 KG/M2 | TEMPERATURE: 97 F | OXYGEN SATURATION: 100 % | HEART RATE: 71 BPM | SYSTOLIC BLOOD PRESSURE: 130 MMHG | DIASTOLIC BLOOD PRESSURE: 72 MMHG | RESPIRATION RATE: 16 BRPM | HEIGHT: 67 IN

## 2024-09-30 PROBLEM — Z34.90 PREGNANCY: Status: RESOLVED | Noted: 2024-09-27 | Resolved: 2024-09-30

## 2024-09-30 PROCEDURE — 25010000002 ENOXAPARIN PER 10 MG: Performed by: STUDENT IN AN ORGANIZED HEALTH CARE EDUCATION/TRAINING PROGRAM

## 2024-09-30 RX ORDER — FERROUS SULFATE 325(65) MG
325 TABLET ORAL EVERY OTHER DAY
Qty: 30 TABLET | Refills: 0 | Status: SHIPPED | OUTPATIENT
Start: 2024-09-30

## 2024-09-30 RX ORDER — IBUPROFEN 600 MG/1
600 TABLET, FILM COATED ORAL EVERY 6 HOURS
Qty: 40 TABLET | Refills: 0 | Status: SHIPPED | OUTPATIENT
Start: 2024-09-30

## 2024-09-30 RX ORDER — OXYCODONE HYDROCHLORIDE 5 MG/1
5 TABLET ORAL EVERY 6 HOURS PRN
Qty: 12 TABLET | Refills: 0 | Status: SHIPPED | OUTPATIENT
Start: 2024-09-30 | End: 2024-10-03

## 2024-09-30 RX ADMIN — Medication 1 TABLET: at 08:43

## 2024-09-30 RX ADMIN — SENNOSIDES AND DOCUSATE SODIUM 1 TABLET: 50; 8.6 TABLET ORAL at 08:44

## 2024-09-30 RX ADMIN — IBUPROFEN 600 MG: 600 TABLET, FILM COATED ORAL at 01:05

## 2024-09-30 RX ADMIN — ENOXAPARIN SODIUM 40 MG: 100 INJECTION SUBCUTANEOUS at 06:51

## 2024-09-30 RX ADMIN — IBUPROFEN 600 MG: 600 TABLET, FILM COATED ORAL at 06:51

## 2024-09-30 NOTE — PLAN OF CARE
Goal Outcome Evaluation:                 Goals met adequately for discharge.

## 2024-09-30 NOTE — DISCHARGE SUMMARY
Patient Name: Yusra Dumont  :  1995  MRN:  [id number]    Date of Discharge:  2024    Discharge Diagnosis:   Status post primary low transverse  section      Presenting Problem/History of Present Illness  Pregnancy [Z34.90]       Hospital Course  Patient is a 28 y.o. female presented  from office with nonreassuring ANT, 2/10 at term. Emergent  performed on 24 by Dr. Duenas of female infant weighing 7lb 5.1oz. Her post-operative course was uncomplicated and on POD 3 she was meeting all criteria for discharge including adequate pain control, minimal lochia, and ability to ambulate, void and tolerate PO intake without difficulty. She was discharged home with standard discharge precautions and instructions.     Procedures Performed  Procedure(s):   SECTION PRIMARY           Condition on Discharge:  Post-Op Day 3 S/P   Subjective     Patient reports:    Doing well - ready for discharge. Pain well controlled. Tolerating po and   having flatus. Voiding and ambulating without difficulty. Lochia normal.       Vital Signs  Temp:  [97 °F (36.1 °C)-98.1 °F (36.7 °C)] 97 °F (36.1 °C)  Heart Rate:  [] 71  Resp:  [16-17] 16  BP: (111-130)/(58-75) 130/72    Physical Exam:     Gen: Alert and awake   Abdomen: Soft, ND,  Fundus firm with minimal tenderness   Incision : Intact, without erythema or exudate   Extremities: Calves NT bilaterally         Results from last 7 days   Lab Units 24  0541 24  1504   WBC 10*3/mm3 11.33* 11.05*   HEMOGLOBIN g/dL 10.2* 10.9*   HEMATOCRIT % 30.9* 33.3*   PLATELETS 10*3/mm3 210 266             POD 3 s/p :  stable for discharge; routine instructions reviewed  Acute Blood Loss Anemia d/t postpartum hemorrhage: QBL 1117mL,  hgb 10.2; oral iron every other day    Consults:   Consults       No orders found from 2024 to 2024.            Discharge Disposition  Home or Self Care    Discharge Medications     Discharge  Medications        New Medications        Instructions Start Date   ibuprofen 600 MG tablet  Commonly known as: ADVIL,MOTRIN   600 mg, Oral, Every 6 Hours      oxyCODONE 5 MG immediate release tablet  Commonly known as: ROXICODONE   5 mg, Oral, Every 6 Hours PRN             Changes to Medications        Instructions Start Date   ferrous sulfate 325 (65 FE) MG tablet  What changed: when to take this   325 mg, Oral, Every Other Day             Continue These Medications        Instructions Start Date   ONE A DAY PRENATAL ADV BRAIN PO              Stop These Medications      terconazole 0.4 % vaginal cream  Commonly known as: TERAZOL 7              The patient has been prescribed a controlled substance.  She has been counseled on the risks associated with using the medication.   The addictive potential of this medication and alternatives were discussed carefully with this patient and she demonstrated understanding.  A HARRY report has been obtained and reviewed.         Activity at Discharge:     Follow-up Appointments    Telehealth: 2 weeks  Postpartum Exam: 6 weeks      Test Results Pending at Discharge       BRII Ocasio  09/30/24  09:49 EDT

## 2024-09-30 NOTE — PLAN OF CARE
Problem: Adult Inpatient Plan of Care  Goal: Plan of Care Review  Outcome: Progressing  Flowsheets (Taken 9/30/2024 0748)  Progress: improving  Plan of Care Reviewed With:   patient   spouse  Outcome Evaluation: vss, assessments wnl, ramya dressing in place, pain well controlled with scheduled motrin, up adlib ambulating well, voiding and passing gas,  Goal: Patient-Specific Goal (Individualized)  Outcome: Progressing  Goal: Absence of Hospital-Acquired Illness or Injury  Outcome: Progressing  Intervention: Identify and Manage Fall Risk  Recent Flowsheet Documentation  Taken 9/30/2024 0651 by Lyla Rodriguez RN  Safety Promotion/Fall Prevention: safety round/check completed  Taken 9/30/2024 0613 by Lyla Rodriguez RN  Safety Promotion/Fall Prevention: safety round/check completed  Taken 9/30/2024 0455 by Lyla Rodriguez RN  Safety Promotion/Fall Prevention: safety round/check completed  Taken 9/30/2024 0247 by Lyla Rodriguez RN  Safety Promotion/Fall Prevention: safety round/check completed  Taken 9/30/2024 0105 by Lyla Rodriguez RN  Safety Promotion/Fall Prevention: safety round/check completed  Taken 9/29/2024 2245 by Lyla Rodriguez RN  Safety Promotion/Fall Prevention: safety round/check completed  Taken 9/29/2024 2119 by Lyla Rodriguez RN  Safety Promotion/Fall Prevention: safety round/check completed  Intervention: Prevent Skin Injury  Recent Flowsheet Documentation  Taken 9/29/2024 2119 by Lyla Rodriguez RN  Body Position: position changed independently  Intervention: Prevent and Manage VTE (Venous Thromboembolism) Risk  Recent Flowsheet Documentation  Taken 9/29/2024 2119 by Lyla Rodriguez RN  Activity Management: up ad kurt  Goal: Optimal Comfort and Wellbeing  Outcome: Progressing  Intervention: Monitor Pain and Promote Comfort  Recent Flowsheet Documentation  Taken 9/30/2024 0651 by Lyla Rodriguez RN  Pain Management Interventions: see MAR  Taken 9/30/2024 0105 by Lyla Rodriguez RN  Pain Management  Interventions: see MAR  Intervention: Provide Person-Centered Care  Recent Flowsheet Documentation  Taken 2024 by Lyla Rodriguez, RN  Trust Relationship/Rapport:   care explained   choices provided     Problem: Bleeding (Postpartum  Delivery)  Goal: Hemostasis  Outcome: Progressing     Problem: Infection (Postpartum  Delivery)  Goal: Absence of Infection Signs and Symptoms  Outcome: Progressing     Problem: Pain (Postpartum  Delivery)  Goal: Acceptable Pain Control  Outcome: Progressing  Intervention: Prevent or Manage Pain  Recent Flowsheet Documentation  Taken 2024 0651 by Lyla Rodriguez, RN  Pain Management Interventions: see MAR  Taken 2024 0105 by Lyla Rodriguez, RN  Pain Management Interventions: see MAR     Problem: Breastfeeding  Goal: Effective Breastfeeding  Outcome: Progressing   Goal Outcome Evaluation:  Plan of Care Reviewed With: patient, spouse        Progress: improving  Outcome Evaluation: vss, assessments wnl, ramya dressing in place, pain well controlled with scheduled motrin, up adlib ambulating well, voiding and passing gas,

## 2024-09-30 NOTE — DISCHARGE INSTRUCTIONS
I recommend:  - Light activity such as daily walks will help with healing and recovery.  - Taking your pain medication as prescribed.  - Pelvic rest (nothing in vagina including sex and tampons, no baths or pools) for 6 weeks.    Incisional care:  Disconnect NUNO pump from tubing before shower. Keep end of tubing dry and reconnect pump after shower. After removal of the NUNO, let soapy water run over incision and then let water run over to rinse. Pat dry.    Please call the office if you experience:  - Fever or chills  - Difficulty breathing  - Excessive swelling in 1 leg  - Acute increase in pain  - Increased bleeding (enough to saturate a pad in 1 hour or less)  - Opening of your incision

## 2024-10-09 ENCOUNTER — MATERNAL SCREENING (OUTPATIENT)
Dept: CALL CENTER | Facility: HOSPITAL | Age: 29
End: 2024-10-09
Payer: COMMERCIAL

## 2024-10-09 NOTE — OUTREACH NOTE
Maternal Screening Survey      Flowsheet Row Responses   Facility patient discharged from? [facility]   Attempt successful? No   Unsuccessful attempts Attempt 2              JERRY REZA - Registered Nurse

## 2024-10-09 NOTE — OUTREACH NOTE
Maternal Screening Survey      Flowsheet Row Responses   Facility patient discharged from? [facility]   Attempt successful? No   Unsuccessful attempts Attempt 1              JERRY REZA - Registered Nurse

## 2024-10-10 ENCOUNTER — MATERNAL SCREENING (OUTPATIENT)
Dept: CALL CENTER | Facility: HOSPITAL | Age: 29
End: 2024-10-10
Payer: COMMERCIAL

## 2024-10-10 NOTE — OUTREACH NOTE
Maternal Screening Survey      Flowsheet Row Responses   Facility patient discharged from? [facility]   Attempt successful? No   Unsuccessful attempts Attempt 3   Revoke Decline to participate              JERRY REZA - Registered Nurse

## (undated) DEVICE — RETR PANNUS PANNI ADHS 1P/U LF STRL

## (undated) DEVICE — SUT GUT CHRM 0 CT 27IN 914H

## (undated) DEVICE — SUT MONOCRYL PLS 3/0 CT1 UD/MF 90CM MCP944H

## (undated) DEVICE — Device: Brand: PORTEX

## (undated) DEVICE — SUT MNCRYL PLS ANTIB UD 4/0 PS2 18IN

## (undated) DEVICE — GLV SURG SENSICARE PI MIC PF SZ6 LF STRL

## (undated) DEVICE — PICO 7 10CM X 30CM: Brand: PICO™ 7

## (undated) DEVICE — ANTIBACTERIAL UNDYED BRAIDED (POLYGLACTIN 910), SYNTHETIC ABSORBABLE SUTURE: Brand: COATED VICRYL

## (undated) DEVICE — SOL IRR NACL 0.9PCT BT 1000ML

## (undated) DEVICE — SUT MNCRYL 0/0 CTX 36IN Y398H

## (undated) DEVICE — NDL BLNT 18G 1 1/2IN

## (undated) DEVICE — SLV SCD CALF HEMOFORCE DVT THERP REPROC MD